# Patient Record
Sex: MALE | Race: WHITE | NOT HISPANIC OR LATINO | Employment: OTHER | URBAN - METROPOLITAN AREA
[De-identification: names, ages, dates, MRNs, and addresses within clinical notes are randomized per-mention and may not be internally consistent; named-entity substitution may affect disease eponyms.]

---

## 2024-04-08 ENCOUNTER — OFFICE VISIT (OUTPATIENT)
Dept: UROLOGY | Facility: CLINIC | Age: 84
End: 2024-04-08
Payer: MEDICARE

## 2024-04-08 VITALS
HEART RATE: 66 BPM | SYSTOLIC BLOOD PRESSURE: 165 MMHG | DIASTOLIC BLOOD PRESSURE: 67 MMHG | BODY MASS INDEX: 26.31 KG/M2 | HEIGHT: 72 IN | WEIGHT: 194.25 LBS

## 2024-04-08 DIAGNOSIS — C67.8 MALIGNANT NEOPLASM OF OVERLAPPING SITES OF BLADDER: Primary | ICD-10-CM

## 2024-04-08 DIAGNOSIS — R31.29 OTHER MICROSCOPIC HEMATURIA: ICD-10-CM

## 2024-04-08 LAB
BILIRUB UR QL STRIP: NEGATIVE
GLUCOSE UR QL STRIP: NEGATIVE
KETONES UR QL STRIP: NEGATIVE
LEUKOCYTE ESTERASE UR QL STRIP: NEGATIVE
PH, POC UA: 5
POC BLOOD, URINE: POSITIVE
POC NITRATES, URINE: NEGATIVE
POC RESIDUAL URINE VOLUME: 146 ML (ref 0–100)
PROT UR QL STRIP: NEGATIVE
SP GR UR STRIP: 1 (ref 1–1.03)
UROBILINOGEN UR STRIP-ACNC: 0.2 (ref 0.3–2.2)

## 2024-04-08 PROCEDURE — 99203 OFFICE O/P NEW LOW 30 MIN: CPT | Mod: PBBFAC,25 | Performed by: UROLOGY

## 2024-04-08 PROCEDURE — 99999PBSHW POCT BLADDER SCAN: Mod: PBBFAC,,,

## 2024-04-08 PROCEDURE — 51798 US URINE CAPACITY MEASURE: CPT | Mod: PBBFAC | Performed by: UROLOGY

## 2024-04-08 PROCEDURE — 81003 URINALYSIS AUTO W/O SCOPE: CPT | Mod: PBBFAC | Performed by: UROLOGY

## 2024-04-08 PROCEDURE — 99999PBSHW POCT URINALYSIS, DIPSTICK, AUTOMATED, W/O SCOPE: Mod: PBBFAC,,,

## 2024-04-08 PROCEDURE — 99999 PR PBB SHADOW E&M-NEW PATIENT-LVL III: CPT | Mod: PBBFAC,,, | Performed by: UROLOGY

## 2024-04-08 PROCEDURE — 99214 OFFICE O/P EST MOD 30 MIN: CPT | Mod: S$PBB,,, | Performed by: UROLOGY

## 2024-04-08 RX ORDER — SIMVASTATIN 20 MG/1
20 TABLET, FILM COATED ORAL
COMMUNITY
Start: 2024-01-29

## 2024-04-08 RX ORDER — METFORMIN HYDROCHLORIDE 1000 MG/1
1000 TABLET ORAL 2 TIMES DAILY
COMMUNITY

## 2024-04-08 RX ORDER — GLIPIZIDE 5 MG/1
5 TABLET ORAL 2 TIMES DAILY
COMMUNITY
Start: 2024-01-29

## 2024-04-08 RX ORDER — CLOPIDOGREL BISULFATE 75 MG/1
1 TABLET ORAL EVERY MORNING
COMMUNITY
Start: 2024-01-29

## 2024-04-08 RX ORDER — ASPIRIN 81 MG/1
1 TABLET ORAL EVERY MORNING
COMMUNITY

## 2024-04-08 NOTE — PROGRESS NOTES
Subjective:       Patient ID: Alexander Crawford is a 83 y.o. male.    Chief Complaint: Establish Care (/)      History of Present Illness:     Mr Crawford underwent a TURBT by me towards the end of 2023 for bladder cancer.  He underwent induction BCG treatments from December 2023 through the end of January 2024.  He tolerated the BCG treatments well.  Denies any significant LUTS.  No gross hematuria.           No past medical history on file.  Family History   Problem Relation Age of Onset    Lung cancer Father      Social History     Socioeconomic History    Marital status:    Tobacco Use    Smoking status: Former     Types: Cigarettes    Smokeless tobacco: Never    Tobacco comments:     Quit 30 yrs ago.    Substance and Sexual Activity    Alcohol use: Not Currently     Comment: Ocassionally drinks beer.    Drug use: Never     Outpatient Encounter Medications as of 4/8/2024   Medication Sig Dispense Refill    clopidogreL (PLAVIX) 75 mg tablet Take 1 tablet by mouth every morning.      glipiZIDE (GLUCOTROL) 5 MG tablet Take 5 mg by mouth 2 (two) times daily.      simvastatin (ZOCOR) 20 MG tablet Take 20 mg by mouth.      aspirin (ECOTRIN) 81 MG EC tablet Take 1 tablet by mouth every morning.      metFORMIN (GLUCOPHAGE) 1000 MG tablet Take 1,000 mg by mouth 2 (two) times daily.       No facility-administered encounter medications on file as of 4/8/2024.        Review of Systems   Constitutional:  Negative for chills and fever.   Respiratory:  Negative for shortness of breath.    Cardiovascular:  Negative for chest pain.   Gastrointestinal:  Negative for nausea and vomiting.   Genitourinary:  Negative for difficulty urinating, dysuria and hematuria.   Musculoskeletal:  Negative for back pain.   Skin:  Negative for rash.   Neurological:  Negative for dizziness.   Psychiatric/Behavioral:  Negative for agitation.        Objective:     BP (!) 165/67 (BP Location: Left arm, Patient Position: Sitting)   Pulse 66   Ht 6'  (1.829 m)   Wt 88.1 kg (194 lb 3.6 oz)   BMI 26.34 kg/m²     Physical Exam  Constitutional:       Appearance: Normal appearance.   Pulmonary:      Effort: Pulmonary effort is normal.   Abdominal:      Palpations: Abdomen is soft.   Neurological:      Mental Status: He is alert and oriented to person, place, and time.   Psychiatric:         Mood and Affect: Mood normal.         Office Visit on 04/08/2024   Component Date Value Ref Range Status    POC Residual Urine Volume 04/08/2024 146 (A)  0 - 100 mL Final    POC Blood, Urine 04/08/2024 Positive (A)  Negative Final    POC Bilirubin, Urine 04/08/2024 Negative  Negative Final    POC Urobilinogen, Urine 04/08/2024 0.2 (A)  0.3 - 2.2 Final    POC Ketones, Urine 04/08/2024 Negative  Negative Final    POC Protein, Urine 04/08/2024 Negative  Negative Final    POC Nitrates, Urine 04/08/2024 Negative  Negative Final    POC Glucose, Urine 04/08/2024 Negative  Negative Final    pH, UA 04/08/2024 5.0   Final    POC Specific Gravity, Urine 04/08/2024 1.005  1.003 - 1.029 Final    POC Leukocytes, Urine 04/08/2024 Negative  Negative Final        Results for orders placed or performed in visit on 04/08/24 (from the past 8760 hour(s))   POCT Bladder Scan   Result Value    POC Residual Urine Volume 146 (A)        Assessment:       1. Malignant neoplasm of overlapping sites of bladder    2. Other microscopic hematuria      Plan:     Orders Placed This Encounter    POCT Bladder Scan    POCT Urinalysis, Dipstick, Automated, W/O Scope        Assessment:  - TURBT by me towards the end of 2023 for bladder cancer.  He underwent induction BCG treatments from December 2023 through the end of January 2024.  - Persistent microscopic hematuria.  Small amount of microscopic hematuria today on 4-8-24.    Plan:  - RTC in 2 weeks for a surveillance cystoscopy.

## 2024-04-24 ENCOUNTER — PROCEDURE VISIT (OUTPATIENT)
Dept: UROLOGY | Facility: CLINIC | Age: 84
End: 2024-04-24
Payer: MEDICARE

## 2024-04-24 ENCOUNTER — HOSPITAL ENCOUNTER (OUTPATIENT)
Dept: RADIOLOGY | Facility: HOSPITAL | Age: 84
Discharge: HOME | End: 2024-04-24
Attending: UROLOGY
Payer: MEDICARE

## 2024-04-24 DIAGNOSIS — R31.29 OTHER MICROSCOPIC HEMATURIA: ICD-10-CM

## 2024-04-24 DIAGNOSIS — Z01.818 PREOP TESTING: ICD-10-CM

## 2024-04-24 DIAGNOSIS — C67.8 MALIGNANT NEOPLASM OF OVERLAPPING SITES OF BLADDER: Primary | ICD-10-CM

## 2024-04-24 LAB
BILIRUB UR QL STRIP: NEGATIVE
GLUCOSE UR QL STRIP: NEGATIVE
KETONES UR QL STRIP: NEGATIVE
LEUKOCYTE ESTERASE UR QL STRIP: NEGATIVE
PH, POC UA: 5.5
POC BLOOD, URINE: NEGATIVE
POC NITRATES, URINE: NEGATIVE
PROT UR QL STRIP: NEGATIVE
SP GR UR STRIP: 1.01 (ref 1–1.03)
UROBILINOGEN UR STRIP-ACNC: 0.2 (ref 0.3–2.2)

## 2024-04-24 PROCEDURE — 99999PBSHW POCT URINALYSIS, DIPSTICK, AUTOMATED, W/O SCOPE: Mod: PBBFAC,,,

## 2024-04-24 PROCEDURE — 88112 CYTOPATH CELL ENHANCE TECH: CPT | Performed by: PATHOLOGY

## 2024-04-24 PROCEDURE — 71046 X-RAY EXAM CHEST 2 VIEWS: CPT | Mod: 26,,, | Performed by: RADIOLOGY

## 2024-04-24 PROCEDURE — 52000 CYSTOURETHROSCOPY: CPT | Mod: S$PBB,,, | Performed by: UROLOGY

## 2024-04-24 PROCEDURE — 52000 CYSTOURETHROSCOPY: CPT | Mod: PBBFAC | Performed by: UROLOGY

## 2024-04-24 PROCEDURE — 99214 OFFICE O/P EST MOD 30 MIN: CPT | Mod: 57,S$PBB,, | Performed by: UROLOGY

## 2024-04-24 PROCEDURE — 71046 X-RAY EXAM CHEST 2 VIEWS: CPT | Mod: TC

## 2024-04-24 PROCEDURE — 81003 URINALYSIS AUTO W/O SCOPE: CPT | Mod: PBBFAC | Performed by: UROLOGY

## 2024-04-24 PROCEDURE — 88112 CYTOPATH CELL ENHANCE TECH: CPT | Mod: 26,,, | Performed by: PATHOLOGY

## 2024-04-24 RX ORDER — CIPROFLOXACIN 2 MG/ML
400 INJECTION, SOLUTION INTRAVENOUS
Status: CANCELLED | OUTPATIENT
Start: 2024-04-24

## 2024-04-24 NOTE — PROCEDURES
Cystoscopy    Date/Time: 4/24/2024 2:00 PM    Performed by: Raoul Rachel MD  Authorized by: Raoul Rachel MD    Consent Done?:  Yes (Verbal) and Yes (Written)  Timeout: prior to procedure the correct patient, procedure, and site was verified    Prep: patient was prepped and draped in usual sterile fashion    Local anesthesia used?: Yes    Anesthesia:  Lidocaine jelly  Indications: history bladder cancer    Position:  Supine  Anesthesia:  Lidocaine jelly  Preparation: Patient was prepped and draped in usual sterile fashion    Scope type:  Flexible cystoscope   patient tolerated the procedure well with no immediate complications  Comments:      The flexible cystoscope was advanced through his urethra into his bladder.  The bladder was inspected in a systematic fashion.  His bilateral ureteral orifices are orthotopic and patent with clear efflux of urine.  There is an approximately 2.5 cm papillary bladder tumor located at the right posterior wall of his bladder.  There is a few smaller papillary bladder tumors at the dome of his bladder.  There is a small bladder tumor located adjacent to his right ureteral orifice.  No stone and no foreign body is seen.  Moderate bladder trabeculations.  Moderately enlarged lateral lobes of his prostate and mildly enlarged median lobe of his prostate.   A bladder wash urine cytology was obtained.  The cystoscope was removed from his bladder.  The patient tolerated this procedure well.          11-22-23  Renal ultrasound.  No hydronephrosis or renal stone is seen bilaterally.  9 mm, 7 mm, and 6 mm adjacent simple appearing left renal cysts.  No right renal lesion is seen.    I reviewed his above imaging result.         10-13-23  PSA 0.99    I reviewed his above lab result.         Assessment:  - Bladder cancer.    - Surveillance cystoscopy today on 4-24-24 shows an approximately 2.5 cm papillary bladder tumor located at the right posterior wall of his bladder.  There is a few  smaller papillary bladder tumors at the dome of his bladder.  There is a small bladder tumor located adjacent to his right ureteral orifice.  Moderate bladder trabeculations. BPH.  - History of a TURBT and bladder biopsies with fulguration of 5 bladder tumors, normal bilateral RGPs, and right ureteral stent placement on 11-8-23.  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  Other biopsy locations showed high grade papillary urothelial carcinoma non invasive.    - History of bladder biopsies on 8-29-23 by Dr Moe Henning, Urologist in [place].  Path showed high grade urothelial carcinoma with no definitive invasion.  - He underwent induction BCG treatments (6 treatments) starting on 12-6-23.  - Persistent microscopic hematuria.  - He is a former smoker.  - CAD.  History of an MI.  He takes aspirin 81 mg and plavix.  He says that Dr Ty Suh is his Cardiologist.    Plan:  - I discussed options with the patient after his cystoscopy today and the mutual decision was made to proceed to the OR for a TURBT, cystoscopy with bladder biopsies with fulguration, bilateral retrograde pyelograms, right ureteral stent placement, possible left ureteral stent placement, and any other indicated procedures.  Risks and benefits of the surgery were explained to the patient and the patient expressed understanding.  All questions were answered by me to the patient's apparent understanding and to the patient's apparent satisfaction.  Surgery consent was signed today by the patient.   - EKG, chest x-ray, CMP, and CBC today.  - Bladder wash urine cytology today.  - Referral to his Cardiologist, Dr Ty Suh, for preop cardiac clearance and for clearance to stop blood thinners prior to his surgery.

## 2024-04-25 RX ORDER — ISOSORBIDE MONONITRATE 120 MG/1
120 TABLET, EXTENDED RELEASE ORAL DAILY
COMMUNITY

## 2024-04-25 RX ORDER — GABAPENTIN 300 MG/1
300 CAPSULE ORAL DAILY
COMMUNITY

## 2024-04-25 RX ORDER — LOSARTAN POTASSIUM AND HYDROCHLOROTHIAZIDE 12.5; 5 MG/1; MG/1
1 TABLET ORAL DAILY
COMMUNITY

## 2024-04-26 LAB
FINAL PATHOLOGIC DIAGNOSIS: ABNORMAL
Lab: ABNORMAL

## 2024-04-29 ENCOUNTER — TELEPHONE (OUTPATIENT)
Dept: PREADMISSION TESTING | Facility: HOSPITAL | Age: 84
End: 2024-04-29
Payer: MEDICARE

## 2024-04-29 NOTE — TELEPHONE ENCOUNTER
Pre op instructions reviewed with pt over telephone, verbalized understanding.    To confirm, Surgery is scheduled on 5/3/24. We will call you late afternoon the business day prior to surgery with your arrival time.    *Please report to the [facility] Lobby (1st Floor) located off of Critical access hospital (2nd Entrance/Building on the left, in front of the flag pole).  Address: [address] [place] [address] [place] [address]    Testing/Clearances needed before Surgery: cardiac clearance pending- Dr Quezada      INSTRUCTIONS IMPORTANT!!!  DO NOT Eat, Drink, or Smoke after 12 midnight unless instructed otherwise by your Surgeon. OK to brush teeth, no gum, candy or mints!    >>>MEDICATION INSTRUCTIONS<<<: Morning of Surgery, take small sip of water with ONLY these medications:  Isosorbide       *Blood Thinners: Call Cardiology office to confirm when to Stop taking Plavix prior to surgery per Physician Instructions! Call your Surgeon office to inquire about any questions regarding your blood thinner medication.    *Diabetic/ Prediabetic Patients: !!!If you take diabetic or weight loss medication, Do NOT take morning of surgery unless instructed by Doctor!!!  Metformin to be stopped 24 hrs prior to surgery.   Long Acting Insulin Instructions: HOLD the night before surgery unless instructed differently by Provider!  Ozempic/ Mounjaro/ Wegovy/ Trulicity/ Semaglutide injections or weight loss medication to be stopped 7 days prior to surgery.    !!!STOP ALL Aspirins, NSAIDS, WEIGHT LOSS INJECTIONS/PILLS, Herbal supplements, & Vitamins 7 DAYS BEFORE SURGERY!!!    ____  Avoid Alcoholic beverages 3 days prior to surgery, as it can thin the blood.  ____  NO Acrylic/fake nails or nail polish worn day of surgery (specifically hand/arm & foot surgeries).  ____  NO powder, lotions, deodorants, oils or cream on body.  ____  Remove all jewelry & piercings & foreign objects before arrival & leave at home.  ____  Remove Dentures, Hearing Aids  & Contact Lens prior to surgery.  ____  Bring photo ID and insurance information to hospital (Leave Valuables at Home).  ____  If going home the same day, arrange for a ride home. You will not be able to drive for 24 hrs if Anesthesia was used.   ____  Females (ages 11-60): may need to give a urine sample the morning of surgery; please see Pre op Nurse prior to using the restroom.  ____  Males: Stop ED medications (Viagra, Cialis) 24 hrs prior to surgery.  ____  Wear clean, loose fitting clothing to allow for dressings/ bandages.      Bathing Instructions:    -Shower with anti-bacterial Soap (Hibiclens or Dial) the night before surgery and the morning of.   -Do not use Hibiclens on your face or genitals.   -Apply clean clothes after shower.  -Do not shave your face or body 2 days prior to surgery unless instructed otherwise by your Surgeon.  -Do not shave pubic hair 7 days prior to surgery (gyn pt's).    [facility] Visitor/Ride Policy:  Only 2 adults allowed in pre op/recovery area during your procedure. You MUST HAVE A RIDE HOME from a responsible adult that you know and trust. Medical Transport, Uber or Lyft can ONLY be used if patient has a responsible adult to accompany them during ride home.       *Signs and symptoms of Infection Before or After Surgery:               !!!If you experience any fever, chills, nausea/ vomiting, foul odor/ excessive drainage from surgical site, flu-like symptoms, new wounds or cuts, PLEASE CALL THE SURGEON OFFICE at [phone] or SEND MESSAGE THROUGH [facility] PORTAL!!!     *If you are running late the morning of surgery, please call the Hospital Surgery Dept @ [phone].     *Billing questions:  [phone]     Thank you,  -[facility] Pre Admit Dept.  [phone] or [phone]  M-F 7:30 am-4:00 pm (Closed Major Holidays)

## 2024-04-30 ENCOUNTER — TELEPHONE (OUTPATIENT)
Dept: UROLOGY | Facility: CLINIC | Age: 84
End: 2024-04-30
Payer: MEDICARE

## 2024-04-30 NOTE — TELEPHONE ENCOUNTER
Called patient on both phone numbers and left a voicemail on both for patient. Also informed patient on the voicemail that we received cardiac clearance and the instructions to stop his Plavix and Aspirin.    Patsy Brannon LPN      ----- Message from Raoul Rachel MD sent at 4/26/2024  9:31 AM CDT -----  I called Mr Crawford and he did not answer the phone and I left him a voice message.  Please call him to verify that he got my voice message that I reviewed his lab results and his labs overall look good.  His hemoglobin was mildly low at 11.9.  His chest x-ray was normal.  Please fax his lab results, chest x-ray result, and EKG result to his PCP and to his Cardiologist and please make sure we get cardiac clearance.  Make sure he knows the details of his surgery.  Ask him if he has any questions.

## 2024-05-02 ENCOUNTER — TELEPHONE (OUTPATIENT)
Dept: PREADMISSION TESTING | Facility: HOSPITAL | Age: 84
End: 2024-05-02
Payer: MEDICARE

## 2024-05-02 NOTE — TELEPHONE ENCOUNTER
Called and spoke with pt about the following:     Please arrive to [facility] at 0730am on 5/3/24 for your scheduled procedure.  Address: [address] [place] [address] [place] [address] (2nd Building on left, 1st Floor Lobby)  >>>NO eating or drinking after midnight unless instructed otherwise by your Surgeon<<<    Thank you,  -[facility] Pre [facility] Testing Dept.  Mon-Fri 7:30 am - 4 pm [phone]

## 2024-05-03 ENCOUNTER — HOSPITAL ENCOUNTER (OUTPATIENT)
Facility: HOSPITAL | Age: 84
Discharge: HOME | End: 2024-05-03
Attending: UROLOGY | Admitting: UROLOGY
Payer: MEDICARE

## 2024-05-03 ENCOUNTER — ANESTHESIA (OUTPATIENT)
Dept: SURGERY | Facility: HOSPITAL | Age: 84
End: 2024-05-03
Payer: MEDICARE

## 2024-05-03 ENCOUNTER — ANESTHESIA EVENT (OUTPATIENT)
Dept: SURGERY | Facility: HOSPITAL | Age: 84
End: 2024-05-03
Payer: MEDICARE

## 2024-05-03 DIAGNOSIS — C67.8 MALIGNANT NEOPLASM OF OVERLAPPING SITES OF BLADDER: ICD-10-CM

## 2024-05-03 LAB — POCT GLUCOSE: 127 MG/DL (ref 70–110)

## 2024-05-03 PROCEDURE — C1769 GUIDE WIRE: HCPCS | Performed by: UROLOGY

## 2024-05-03 PROCEDURE — C1758 CATHETER, URETERAL: HCPCS | Performed by: UROLOGY

## 2024-05-03 PROCEDURE — 37000009 HC ANESTHESIA EA ADD 15 MINS: Performed by: UROLOGY

## 2024-05-03 PROCEDURE — 25000003 PHARM REV CODE 250: Performed by: NURSE ANESTHETIST, CERTIFIED REGISTERED

## 2024-05-03 PROCEDURE — 63600175 PHARM REV CODE 636 W HCPCS: Performed by: ANESTHESIOLOGY

## 2024-05-03 PROCEDURE — 37000008 HC ANESTHESIA 1ST 15 MINUTES: Performed by: UROLOGY

## 2024-05-03 PROCEDURE — 71000033 HC RECOVERY, INTIAL HOUR: Performed by: UROLOGY

## 2024-05-03 PROCEDURE — 25500020 PHARM REV CODE 255: Performed by: UROLOGY

## 2024-05-03 PROCEDURE — 88307 TISSUE EXAM BY PATHOLOGIST: CPT | Mod: 59 | Performed by: PATHOLOGY

## 2024-05-03 PROCEDURE — 36000706: Performed by: UROLOGY

## 2024-05-03 PROCEDURE — 52240 CYSTOSCOPY AND TREATMENT: CPT | Mod: ,,, | Performed by: UROLOGY

## 2024-05-03 PROCEDURE — 25000003 PHARM REV CODE 250: Performed by: UROLOGY

## 2024-05-03 PROCEDURE — 63600175 PHARM REV CODE 636 W HCPCS: Performed by: NURSE ANESTHETIST, CERTIFIED REGISTERED

## 2024-05-03 PROCEDURE — 36000707: Performed by: UROLOGY

## 2024-05-03 PROCEDURE — 71000015 HC POSTOP RECOV 1ST HR: Performed by: UROLOGY

## 2024-05-03 PROCEDURE — 88307 TISSUE EXAM BY PATHOLOGIST: CPT | Mod: 26,,, | Performed by: PATHOLOGY

## 2024-05-03 PROCEDURE — 74420 UROGRAPHY RTRGR +-KUB: CPT | Mod: 26,,, | Performed by: UROLOGY

## 2024-05-03 PROCEDURE — 82962 GLUCOSE BLOOD TEST: CPT | Performed by: UROLOGY

## 2024-05-03 PROCEDURE — 25000003 PHARM REV CODE 250: Performed by: ANESTHESIOLOGY

## 2024-05-03 PROCEDURE — C2617 STENT, NON-COR, TEM W/O DEL: HCPCS | Performed by: UROLOGY

## 2024-05-03 PROCEDURE — 63600175 PHARM REV CODE 636 W HCPCS: Performed by: UROLOGY

## 2024-05-03 PROCEDURE — 27201423 OPTIME MED/SURG SUP & DEVICES STERILE SUPPLY: Performed by: UROLOGY

## 2024-05-03 DEVICE — STENT URETERAL UNIV 6FR 26CM: Type: IMPLANTABLE DEVICE | Site: URETER | Status: FUNCTIONAL

## 2024-05-03 RX ORDER — HYOSCYAMINE SULFATE 0.12 MG/1
0.12 TABLET SUBLINGUAL EVERY 6 HOURS PRN
Qty: 30 TABLET | Refills: 1 | Status: SHIPPED | OUTPATIENT
Start: 2024-05-03

## 2024-05-03 RX ORDER — KETOROLAC TROMETHAMINE 30 MG/ML
15 INJECTION, SOLUTION INTRAMUSCULAR; INTRAVENOUS EVERY 8 HOURS PRN
Status: DISCONTINUED | OUTPATIENT
Start: 2024-05-03 | End: 2024-05-03 | Stop reason: HOSPADM

## 2024-05-03 RX ORDER — HYDROMORPHONE HYDROCHLORIDE 2 MG/ML
0.2 INJECTION, SOLUTION INTRAMUSCULAR; INTRAVENOUS; SUBCUTANEOUS EVERY 5 MIN PRN
Status: DISCONTINUED | OUTPATIENT
Start: 2024-05-03 | End: 2024-05-03 | Stop reason: HOSPADM

## 2024-05-03 RX ORDER — ONDANSETRON HYDROCHLORIDE 2 MG/ML
INJECTION, SOLUTION INTRAVENOUS
Status: DISCONTINUED | OUTPATIENT
Start: 2024-05-03 | End: 2024-05-03

## 2024-05-03 RX ORDER — LIDOCAINE HYDROCHLORIDE 20 MG/ML
JELLY TOPICAL
Status: DISCONTINUED | OUTPATIENT
Start: 2024-05-03 | End: 2024-05-03 | Stop reason: HOSPADM

## 2024-05-03 RX ORDER — ONDANSETRON HYDROCHLORIDE 2 MG/ML
4 INJECTION, SOLUTION INTRAVENOUS DAILY PRN
Status: DISCONTINUED | OUTPATIENT
Start: 2024-05-03 | End: 2024-05-03 | Stop reason: HOSPADM

## 2024-05-03 RX ORDER — ROCURONIUM BROMIDE 10 MG/ML
INJECTION, SOLUTION INTRAVENOUS
Status: DISCONTINUED | OUTPATIENT
Start: 2024-05-03 | End: 2024-05-03

## 2024-05-03 RX ORDER — PROPOFOL 10 MG/ML
VIAL (ML) INTRAVENOUS
Status: DISCONTINUED | OUTPATIENT
Start: 2024-05-03 | End: 2024-05-03

## 2024-05-03 RX ORDER — CIPROFLOXACIN 2 MG/ML
400 INJECTION, SOLUTION INTRAVENOUS
Status: COMPLETED | OUTPATIENT
Start: 2024-05-03 | End: 2024-05-03

## 2024-05-03 RX ORDER — OXYCODONE AND ACETAMINOPHEN 5; 325 MG/1; MG/1
1 TABLET ORAL
Status: DISCONTINUED | OUTPATIENT
Start: 2024-05-03 | End: 2024-05-03 | Stop reason: HOSPADM

## 2024-05-03 RX ADMIN — HYDROMORPHONE HYDROCHLORIDE 0.2 MG: 2 INJECTION, SOLUTION INTRAMUSCULAR; INTRAVENOUS; SUBCUTANEOUS at 10:05

## 2024-05-03 RX ADMIN — CIPROFLOXACIN 400 MG: 2 INJECTION, SOLUTION INTRAVENOUS at 08:05

## 2024-05-03 RX ADMIN — OXYCODONE HYDROCHLORIDE AND ACETAMINOPHEN 1 TABLET: 5; 325 TABLET ORAL at 10:05

## 2024-05-03 RX ADMIN — ONDANSETRON 4 MG: 2 INJECTION INTRAMUSCULAR; INTRAVENOUS at 08:05

## 2024-05-03 RX ADMIN — ROCURONIUM BROMIDE 50 MG: 10 INJECTION, SOLUTION INTRAVENOUS at 08:05

## 2024-05-03 RX ADMIN — SODIUM CHLORIDE, SODIUM LACTATE, POTASSIUM CHLORIDE, AND CALCIUM CHLORIDE: .6; .31; .03; .02 INJECTION, SOLUTION INTRAVENOUS at 09:05

## 2024-05-03 RX ADMIN — KETOROLAC TROMETHAMINE 15 MG: 30 INJECTION, SOLUTION INTRAMUSCULAR at 10:05

## 2024-05-03 RX ADMIN — PROPOFOL 100 MG: 10 INJECTION, EMULSION INTRAVENOUS at 08:05

## 2024-05-03 RX ADMIN — SODIUM CHLORIDE, SODIUM LACTATE, POTASSIUM CHLORIDE, AND CALCIUM CHLORIDE: .6; .31; .03; .02 INJECTION, SOLUTION INTRAVENOUS at 08:05

## 2024-05-03 RX ADMIN — SUGAMMADEX 200 MG: 100 INJECTION, SOLUTION INTRAVENOUS at 08:05

## 2024-05-03 NOTE — ANESTHESIA PROCEDURE NOTES
Intubation    Date/Time: 5/3/2024 8:37 AM    Performed by: Iron Boudreaux CRNA  Authorized by: Tawanda Wood II, MD    Intubation:     Induction:  Intravenous    Intubated:  Postinduction    Mask Ventilation:  Easy mask    Attempts:  1    Attempted By:  CRNA    Method of Intubation:  Direct    Blade:  Osman 3    Laryngeal View Grade: Grade I - full view of cords      Difficult Airway Encountered?: No      Complications:  None    Airway Device:  Oral endotracheal tube    Airway Device Size:  7.5    Style/Cuff Inflation:  Cuffed (inflated to minimal occlusive pressure)    Inflation Amount (mL):  8    Tube secured:  22    Secured at:  The lips    Placement Verified By:  Capnometry    Complicating Factors:  None    Findings Post-Intubation:  BS equal bilateral

## 2024-05-03 NOTE — ANESTHESIA POSTPROCEDURE EVALUATION
Anesthesia Post Evaluation    Patient: Alexander Crawford    Procedure(s) Performed: Procedure(s) (LRB):  TURBT (TRANSURETHRAL RESECTION OF BLADDER TUMOR) (Bilateral)  CYSTOSCOPY, WITH RETROGRADE PYELOGRAM AND URETERAL STENT INSERTION (Bilateral)    Final Anesthesia Type: general      Patient location during evaluation: PACU  Patient participation: Yes- Able to Participate  Level of consciousness: awake and alert  Post-procedure vital signs: reviewed and stable  Pain management: adequate  Airway patency: patent  ANITA mitigation strategies: Verification of full reversal of neuromuscular block  PONV status at discharge: No PONV  Anesthetic complications: no      Cardiovascular status: hemodynamically stable  Respiratory status: spontaneous ventilation  Hydration status: euvolemic  Follow-up not needed.              Vitals Value Taken Time   /88 05/03/24 1040   Temp 36.6 °C (97.8 °F) 05/03/24 1040   Pulse 52 05/03/24 1042   Resp 19 05/03/24 1042   SpO2 97 % 05/03/24 1042   Vitals shown include unfiled device data.      Event Time   Out of Recovery 10:45:47         Pain/Toshia Score: Pain Rating Prior to Med Admin: 5 (5/3/2024 10:32 AM)  Toshia Score: 10 (5/3/2024 11:10 AM)

## 2024-05-03 NOTE — TRANSFER OF CARE
Anesthesia Transfer of Care Note    Patient: Alexander Crawford    Procedure(s) Performed: Procedure(s) (LRB):  TURBT (TRANSURETHRAL RESECTION OF BLADDER TUMOR) (Bilateral)  CYSTOSCOPY, WITH RETROGRADE PYELOGRAM AND URETERAL STENT INSERTION (Bilateral)    Patient location: PACU    Anesthesia Type: general    Transport from OR: Transported from OR on room air with adequate spontaneous ventilation    Post pain: adequate analgesia    Post assessment: no apparent anesthetic complications    Post vital signs: stable    Level of consciousness: awake    Nausea/Vomiting: no nausea/vomiting    Complications: none    Transfer of care protocol was followed      Last vitals: Visit Vitals  BP (!) 190/82   Pulse (!) 58   Temp 36.8 °C (98.2 °F) (Temporal)   Resp 16   Ht 6' (1.829 m)   Wt 85.3 kg (188 lb 2.6 oz)   SpO2 98%   BMI 25.52 kg/m²

## 2024-05-03 NOTE — OP NOTE
Surgeon:  Dr Raoul Rachel.     Date:  5-3-2024.     Pre operative diagnosis:  Bladder cancer in multiple sites in his bladder.     Post operative diagnosis:  Bladder cancer in multiple sites in his bladder.     Surgery:  1) Transurethral resection of a large bladder tumor measuring up to 5.5 cm and several other bladder tumors..   2) Bilateral retrograde pyelograms with right ureteral stent placement.     Anesthesia:  General.     Estimated blood loss:  1 ml.     Complications:  None.     Specimens:  Bladder tumors for permanent specimen.     Procedure in details:  Risks and benefits of the surgery were explained to the patient prior to the surgery and the patient expressed understanding.  All questions were answered by me to the patient's apparent understanding and to the patient's apparent satisfaction.  Surgery consent was signed by the patient prior to the surgery.  The patient was taken to the OR and placed in the supine position initially.  Anesthesia was administered by the Anesthesia team.  The patient was then placed in the dorsal lithotomy position.  He was prepped and drapped in the usual sterile fashion.  An IV antibiotic was given to the patient prior to the surgery.  A timeout was done prior to the surgery.  A 21 F rigid cystoscope was advanced through the urethra into his bladder.  The bladder was inspected in a systematic fashion.  There is a large papillary left posterior bladder wall tumor measuring approximately 5.5 cm in size.  There is another papillary bladder tumor located at the midline of the posterior wall of his bladder measuring approximately 2 cm.  There are 2 bladder dome tumors measuring approximately 1.5 cm and 2 cm.  There is another flat bladder tumor about 2 cm in size located just medial and inferior but adjacent to his right ureteral orifice.   His bilateral ureteral orifices are orthotopic and patent with clear efflux.  I cannulated his left ureteral orifice with a 5 F open ended  ureteral catheter and a gentle left retrograde pyelogram was shot which was normal with no hydronephrosis, no filling defect, no extravasation, and good drainage of his left kidney.   I then cannulated his right ureteral orifice with a 5 F open ended ureteral catheter and a gentle right retrograde pyelogram was shot which was normal with no hydronephrosis, no filling defect, no extravasation, and good drainage of his right kidney.  Due to the location of his right sided bladder tumor located adjacent to his right ureteral orifice, I placed a right ureteral stent.  A motion wire was advanced through the ureteral catheter into his right renal collecting system which was seen under fluoroscopy.  The 5 F open ended ureteral catheter was then back loaded off of the wire.  The string was then cut off of the right ureteral stent.  I then advanced a 6 F by 26 cm right ureteral stent over the sensor wire and used the pusher to push the distal curl into his bladder.  The proximal curl of his right ureteral stent was seen in good position within his right renal collecting system fluoroscopically.  The distal curl of his right ureteral stent was seen to be in good position cystoscopically.  The cystoscope was then removed from his body.  The resectoscope with then advanced through his urethra into his bladder.  I used the resectoscope loop to resect all of his bladder tumors to visual completion.  I asked the nurse to label all of the removed bladder tumor specimens as they were removed which was the large left posterior bladder wall tumor, the midline posterior bladder wall tumor, the 2 bladder dome tumors, and the bladder tumor just adjacent to his right ureteral orifice.  I asked the nurse to send all of his bladder tumor specimens to pathology for permanent specimen.  I used the loop to cauterize all areas of the bladder that were resected. I used the ellick evacuator to irrigate his bladder several times with sterile water  at the end of the procedure..   I then turn down the flow and I did not see any bleeding from bladder resection sites or anywhere else.  I did not see any further tumors or abnormal urothelial lesions at the resection sites or anywhere else.  The resectoscope was then removed from his body.  I then placed a 22 F 2 way stacy catheter through the urethra into his bladder.  When yellow colored urine drained from his stacy catheter, 10 cc of sterile water was inflated into his stacy catheter balloon.  I manually irrigated his stacy catheter with 60 cc of sterile water and his stacy catheter irrigated easily, there were no blood clots, and his urine was clear.  A large drainage bag was attached to his stacy catheter.  A post operative timeout was done at the end of the procedure.  The patient was taken to the recovery room in stable condition at the end of the procedure.

## 2024-05-03 NOTE — ANESTHESIA PREPROCEDURE EVALUATION
05/03/2024  Alexander Crawford is a 83 y.o., male.    There is no problem list on file for this patient.    Past Surgical History:   Procedure Laterality Date    BACK SURGERY      3 back surgeries    CAROTID ENDARTERECTOMY Bilateral     CORONARY ANGIOPLASTY      CYSTOSCOPY      TURBT (TRANSURETHRAL RESECTION OF BLADDER TUMOR)      x2       Pre-op Assessment    I have reviewed the Patient Summary Reports.    I have reviewed the NPO Status.   I have reviewed the Medications.     Review of Systems  Anesthesia Hx:  No problems with previous Anesthesia                Social:  Non-Smoker, Former Smoker       Hematology/Oncology:  Hematology Normal                                     Cardiovascular:     Hypertension  Past MI CAD           hyperlipidemia   ECG has been reviewed. Pt has had balloon angioplasty approx 3 years ago.  Occasional chest pain treated with isosorbide.  This has been stable.  Saw his cardiologist recently.                         Pulmonary:  Pulmonary Normal                       Renal/:  Renal/ Normal                 Hepatic/GI:  Hepatic/GI Normal                 Neurological:   CVA                                    Endocrine:  Diabetes               Physical Exam  General: Well nourished    Airway:  Mallampati: III   Mouth Opening: Normal  TM Distance: Normal  Neck ROM: Normal ROM    Dental:  Intact        Anesthesia Plan  Type of Anesthesia, risks & benefits discussed:    Anesthesia Type: Gen ETT, Gen Supraglottic Airway  Intra-op Monitoring Plan: Standard ASA Monitors  Post Op Pain Control Plan: multimodal analgesia  Induction:  IV  Airway Plan: , Post-Induction  Informed Consent: Informed consent signed with the Patient and all parties understand the risks and agree with anesthesia plan.  All questions answered.   ASA Score: 3    Ready For Surgery From Anesthesia Perspective.     .       Chemistry        Component Value Date/Time     04/24/2024 1525    K 4.1 04/24/2024 1525     04/24/2024 1525    CO2 25 04/24/2024 1525    BUN 19 04/24/2024 1525    CREATININE 1.1 04/24/2024 1525    GLU 93 04/24/2024 1525        Component Value Date/Time    CALCIUM 9.5 04/24/2024 1525    ALKPHOS 71 04/24/2024 1525    AST 12 04/24/2024 1525    ALT 7 (L) 04/24/2024 1525    BILITOT 0.6 04/24/2024 1525        Lab Results   Component Value Date    WBC 4.69 04/24/2024    HGB 11.9 (L) 04/24/2024    HCT 36.3 (L) 04/24/2024     (H) 04/24/2024     04/24/2024

## 2024-05-07 ENCOUNTER — TELEPHONE (OUTPATIENT)
Dept: UROLOGY | Facility: CLINIC | Age: 84
End: 2024-05-07
Payer: MEDICARE

## 2024-05-07 VITALS
HEART RATE: 59 BPM | OXYGEN SATURATION: 99 % | RESPIRATION RATE: 18 BRPM | TEMPERATURE: 98 F | HEIGHT: 72 IN | WEIGHT: 188.19 LBS | SYSTOLIC BLOOD PRESSURE: 174 MMHG | BODY MASS INDEX: 25.49 KG/M2 | DIASTOLIC BLOOD PRESSURE: 81 MMHG

## 2024-05-07 LAB
FINAL PATHOLOGIC DIAGNOSIS: NORMAL
GROSS: NORMAL
Lab: NORMAL

## 2024-05-07 NOTE — TELEPHONE ENCOUNTER
CHELSEY for patient letting him know of post-op appointment with Dr. Rachel on 05/10/2024.    Patsy Brannon LPN  ----- Message from Lilia Cox sent at 5/6/2024 11:34 AM CDT -----  Contact: self  [phone]  Patient called in this morning wanting to know when his catheter be removed. [facility]  [phone]. Thanks homa

## 2024-05-07 NOTE — TELEPHONE ENCOUNTER
Patient was scheduled, I called home phone as well as patient cell phone number and left a detailed message in regards to his appointment.     ----- Message from Raoul Rachel MD sent at 5/3/2024 10:49 AM CDT -----  Please call and schedule Mr Crawford a post operative appointment to see me on Friday 5- at [facility]

## 2024-05-10 ENCOUNTER — OFFICE VISIT (OUTPATIENT)
Dept: UROLOGY | Facility: CLINIC | Age: 84
End: 2024-05-10
Payer: MEDICARE

## 2024-05-10 VITALS
WEIGHT: 190.38 LBS | RESPIRATION RATE: 16 BRPM | SYSTOLIC BLOOD PRESSURE: 144 MMHG | HEART RATE: 76 BPM | BODY MASS INDEX: 25.78 KG/M2 | HEIGHT: 72 IN | DIASTOLIC BLOOD PRESSURE: 74 MMHG

## 2024-05-10 DIAGNOSIS — C67.8 MALIGNANT NEOPLASM OF OVERLAPPING SITES OF BLADDER: Primary | ICD-10-CM

## 2024-05-10 DIAGNOSIS — R31.29 OTHER MICROSCOPIC HEMATURIA: ICD-10-CM

## 2024-05-10 PROCEDURE — 99213 OFFICE O/P EST LOW 20 MIN: CPT | Mod: PBBFAC | Performed by: UROLOGY

## 2024-05-10 PROCEDURE — 99999 PR PBB SHADOW E&M-EST. PATIENT-LVL III: CPT | Mod: PBBFAC,,, | Performed by: UROLOGY

## 2024-05-10 PROCEDURE — 99215 OFFICE O/P EST HI 40 MIN: CPT | Mod: S$PBB,,, | Performed by: UROLOGY

## 2024-05-10 NOTE — PROGRESS NOTES
Subjective:       Patient ID: Alexander Crawford is a 83 y.o. male.    Chief Complaint: Post-op Evaluation      History of Present Illness:     Mr Crawford has bladder cancer in multiple sites in his bladder and is s/p transurethral resection of several bladder tumors, bilateral retrograde pyelograms, and right ureteral stent placement by me on 5-3-24.  Pathology of his midline posterior wall, left posterior wall, two dome tumors, and trigone near the right ureteral orifice all showed high grade papillary urothelial carcinoma, no definitive evidence of invasion, and muscularis propria is present and uninvolved by tumor.  He says his stacy catheter is working well.  He says has not had any blood in his urine since the surgery.      He has a history of a TURBT and bladder biopsies with fulguration of 5 bladder tumors, normal bilateral RGPs, and right ureteral stent placement on 11-8-23.  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  Other biopsy locations showed high grade papillary urothelial carcinoma non invasive.  He underwent induction BCG treatments (6 treatments) starting on 12-6-23.    He has a history of bladder biopsies on 8-29-23 by Dr Moe Henning, Urologist in [place].  Path showed high grade urothelial carcinoma with no definitive invasion.           Past Medical History:   Diagnosis Date    Bladder cancer     CAD (coronary artery disease)     CVA (cerebral vascular accident)     Diabetes mellitus     HLD (hyperlipidemia)     Hypertension     Myocardial infarction      Family History   Problem Relation Name Age of Onset    Lung cancer Father       Social History     Socioeconomic History    Marital status:    Tobacco Use    Smoking status: Former     Types: Cigarettes    Smokeless tobacco: Never    Tobacco comments:     Quit 30 yrs ago.    Substance and Sexual Activity    Alcohol use:  Not Currently     Comment: Ocassionally drinks beer.    Drug use: Never     Social Determinants of Health     Financial Resource Strain: Low Risk  (10/31/2023)    [facility]    Overall Financial Resource Strain (CARDIA)     Difficulty of Paying Living Expenses: Not very hard   Food Insecurity: No Food Insecurity (10/31/2023)    [facility]    Hunger Vital Sign     Worried About Running Out of Food in the Last Year: Never true     Ran Out of Food in the Last Year: Never true   Transportation Needs: No Transportation Needs (10/31/2023)    [facility]    PRAPARE - Transportation     Lack of Transportation (Medical): No     Lack of Transportation (Non-Medical): No   Physical Activity: Insufficiently Active (10/31/2023)    [facility], [facility]    Exercise Vital Sign     Days of Exercise per Week: 3 days     Minutes of Exercise per Session: 30 min   Housing Stability: Unknown (10/31/2023)    [facility], [facility]    Housing Stability Vital Sign     Number of Places Lived in the Last Year: 1     Outpatient Encounter Medications as of 5/10/2024   Medication Sig Dispense Refill     aspirin (ECOTRIN) 81 MG EC tablet Take 1 tablet by mouth every morning.      clopidogreL (PLAVIX) 75 mg tablet Take 1 tablet by mouth every morning.      gabapentin (NEURONTIN) 300 MG capsule Take 300 mg by mouth Daily.      glipiZIDE (GLUCOTROL) 5 MG tablet Take 5 mg by mouth 2 (two) times daily.      hyoscyamine (LEVSIN) 0.125 mg Subl Place 1 tablet (0.125 mg total) under the tongue every 6 (six) hours as needed (Take 1 under the tongue every 6 hours as needed for bladder spasms). 30 tablet 1    isosorbide mononitrate (IMDUR) 120 MG 24 hr tablet Take 120 mg by mouth once daily.      losartan-hydrochlorothiazide 50-12.5 mg (HYZAAR) 50-12.5 mg per tablet Take 1 tablet by mouth once daily.      metFORMIN (GLUCOPHAGE) 1000 MG tablet Take 1,000 mg by mouth 2 (two) times daily.      simvastatin (ZOCOR) 20 MG tablet Take 20 mg by mouth.       Facility-Administered Encounter Medications as of 5/10/2024   Medication Dose Route Frequency Provider Last Rate Last Admin    [START ON 6/7/2024] BCG live (PILO) 50 mg in sodium chloride 0.9% 50 mL bladder instillation  50 mg Intravesical See admin instructions             Review of Systems   Constitutional:  Negative for chills and fever.   Respiratory:  Negative for shortness of breath.    Cardiovascular:  Negative for chest pain.   Gastrointestinal:  Negative for nausea and vomiting.   Genitourinary:  Negative for flank pain and hematuria.   Musculoskeletal:  Negative for back pain.   Skin:  Negative for rash.   Neurological:  Negative for dizziness.   Psychiatric/Behavioral:  Negative for agitation.        Objective:     BP (!) 144/74 (BP Location: Right arm, Patient Position: Sitting)   Pulse 76   Resp 16   Ht 6' (1.829 m)   Wt 86.4 kg (190 lb 5.9 oz)   BMI 25.82 kg/m²     Physical Exam  Genitourinary:     Comments: Urethral stacy catheter is in place draining yellow colored urine.        No visits with results within 1 Day(s) from this visit.   Latest known visit with  results is:   Admission on 05/03/2024, Discharged on 05/03/2024   Component Date Value Ref Range Status    POCT Glucose 05/03/2024 127 (H)  70 - 110 mg/dL Final    Final Pathologic Diagnosis 05/03/2024    Final                    Value:1. Bladder, midline posterior wall, transurethral resection:  - High-grade papillary urothelial carcinoma  - No definitive evidence of invasion  - Muscularis propria is present and uninvolved by tumor  - Multiple additional levels have been examined    2. Bladder, left posterior wall, transurethral resection:  - High-grade papillary urothelial carcinoma  - No definitive evidence of invasion  - Muscularis propria is present and uninvolved by tumor  - Multiple additional levels have been examined    3. Bladder, dome, transurethral resection:  - High-grade papillary urothelial carcinoma  - No definitive evidence of invasion  - Muscularis propria is present and uninvolved by tumor  - Multiple additional levels have been examined    4. Bladder, trigone near right ureteral orifice, transurethral resection:  - High-grade papillary urothelial carcinoma  - No definitive evidence of invasion  - Muscularis propria is present and uninvolved by tumor    5. Bladder, right dome, transurethral resecti                          on:  - High-grade papillary urothelial carcinoma  - No definitive evidence of invasion  - Muscularis propria is present and uninvolved by tumor      Interp By PORTILLO Meehan MD, Signed on 05/07/2024 at 11:45    Gross 05/03/2024    Final                    Value:1: Surgery ID:  [id number]   Pathology ID:  [id number]  1. Received in formalin labeled &quot;mid lined posterior bladder wall tumor&quot; are 2 tan-pink tissue fragments aggregating to less than 1 g and 1 x 1 x 0.5 cm.  The specimen is submitted entirely in cassette 1A.  VCU--1-A        Grossed by: Angle Jay MS, PA([facility])   2: Surgery ID:  [id number]   Pathology ID:  [id number]  2. Received in formalin labeled  &quot;left posterior bladder wall tumor&quot; is a less than 1 g, 1.5 x 1.5 x 0.5 cm aggregate of tan-white tan-pink tissue fragments.  The specimen is submitted entirely in cassette 2A.    AYF--2-A        Grossed by: Angel Jay MS, PA([facility])   3: Surgery ID:  [id number]   Pathology ID:  [id number]  3. Received in formalin labeled &quot;dome of bladder&quot; is a single tan-pink tissue fragment measuring 0.8 x 0.8 x 0.4 cm and less than 1 g.  The specimen is submitted entirely in cassette 3A.    LRU--3-A        Grossed by: Angel Jay MS, PA([facility])   4: Surg                          rama ID:  [id number]   Pathology ID:  [id number]  Right trigone near right ureteral orifice Received in formalin labeled &quot;right trigone near right ureteral orifice&quot; is a less than 1 g, 1 x 1 x 0.3 cm aggregate of tan-pink tissue fragments.  The specimen is submitted entirely in cassette 4A.    SJI--4-A        Grossed by: Angel Jay MS, PA([facility])   5: Surgery ID:  [id number]   Pathology ID:  [id number]  5. Received in formalin labeled &quot;right bladder dome&quot; is a single tan-pink tissue fragment measuring less than 1 g and 0.5 x 0.5 x 0.4 cm.  The specimen is submitted entirely in cassette 5A.    RIA--5-A        Grossed by: Angel Jay MS, PA([facility])      Disclaimer 05/03/2024 Unless the case is a 'gross only' or additional testing only, the final diagnosis for each specimen is based on a microscopic examination of appropriate tissue sections.   Final        Results for orders placed or performed in visit on 04/08/24 (from the past 8760 hour(s))   POCT Bladder Scan   Result Value    POC Residual Urine Volume 146 (A)        Assessment:       1. Malignant neoplasm of overlapping sites of bladder    2. Other microscopic hematuria      Plan:     Orders Placed This Encounter    Prior authorization Order    BCG live (PILO) 50 mg in sodium chloride 0.9% 50 mL bladder instillation            11-22-23   Renal ultrasound.  No hydronephrosis or renal stone is seen bilaterally.  9 mm, 7 mm, and 6 mm adjacent simple appearing left renal cysts.  No right renal lesion is seen.     I reviewed his above imaging result.            10-13-23  PSA 0.99    4-24-24  Cr 1.1.  GFR >60.     I reviewed his above lab result.         Assessment:  - Bladder cancer in multiple sites in his bladder s/p transurethral resection of several bladder tumors, bilateral retrograde pyelograms, and right ureteral stent placement on 5-3-24.  Path of his midline posterior wall, left posterior wall, two dome tumors, and trigone near the right ureteral orifice all showed high grade papillary urothelial carcinoma, no definitive evidence of invasion, and muscularis propria is present and uninvolved by tumor.  - He underwent induction BCG treatments (6 treatments) starting on 12-6-23.  - History of a TURBT and bladder biopsies with fulguration of 5 bladder tumors, normal bilateral RGPs, and right ureteral stent placement on 11-8-23.  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  Other biopsy locations showed high grade papillary urothelial carcinoma non invasive.    - History of bladder biopsies on 8-29-23 by Dr Moe Henning, Urologist in [place].  Path showed high grade urothelial carcinoma with no definitive invasion.  - Persistent microscopic hematuria.  - BPH without significant LUTS.  - He is a former smoker.  - CAD.  History of an MI.  He takes aspirin 81 mg and plavix.  He says that Dr Ty Suh is his Cardiologist.    Plan:  - I discussed his pathology report with him today.  I discussed options with him today and the mutual decision was to proceed with a 2nd round of induction BCG treatments (1 treatment per week for 6 weeks).  - I removed his stacy catheter today.  - I discussed dietary modifications with him today and I  recommended he drink mostly water during the day.   - RTC in 4 weeks to start his first of 6 BCG treatments.  - RTC to see me for an office visit 3 weeks after last BCG treatment.  Will plan a restaging TURBT in the OR approximately 6 weeks after his last BCG treatment.  - RTC in 2 weeks for a cystoscopy with right ureteral stent removal.

## 2024-05-15 ENCOUNTER — TELEPHONE (OUTPATIENT)
Dept: UROLOGY | Facility: CLINIC | Age: 84
End: 2024-05-15
Payer: MEDICARE

## 2024-05-15 NOTE — TELEPHONE ENCOUNTER
Called patient and there was a beep but not sure if I was able to leave a message or not.    Patsy Brannon LPN    ----- Message from Jesus Alberto Rosen sent at 5/15/2024 11:03 AM CDT -----  Contact: Alexander Munoz called to give an update on the message he had sent over earlier today 5/15. He will be keeping this appointment for the procedure on Friday 5/24. Please call him at  [phone]  Thanks   Am

## 2024-05-16 ENCOUNTER — TELEPHONE (OUTPATIENT)
Dept: UROLOGY | Facility: CLINIC | Age: 84
End: 2024-05-16
Payer: MEDICARE

## 2024-05-16 NOTE — TELEPHONE ENCOUNTER
Patient reports that he is going to keep scheduled appointment.     ----- Message from Raoul Rachel MD sent at 5/15/2024  7:49 AM CDT -----  Regarding: RE: Procedure  Please call the patient to reschedule his cystoscopy with right ureteral stent removal for Thursday 5-23-24 at the [facility] per the patent's request.  ----- Message -----  From: Noemí Amato MA  Sent: 5/14/2024   2:11 PM CDT  To: Raoul Rachel MD  Subject: Procedure                                        Patient called wanting to reschedule stent pull. Review previous message.  ----- Message -----  From: Rachel Bourgeois  Sent: 5/13/2024   3:25 PM CDT  To: [facility] Silveira Staff    States he has a procedure on 5/24, he would like to reschedule it on 5/23 at 2:00. Please call pt [phone]. Thank you

## 2024-05-24 ENCOUNTER — PROCEDURE VISIT (OUTPATIENT)
Dept: UROLOGY | Facility: CLINIC | Age: 84
End: 2024-05-24
Payer: MEDICARE

## 2024-05-24 DIAGNOSIS — C67.8 MALIGNANT NEOPLASM OF OVERLAPPING SITES OF BLADDER: Primary | ICD-10-CM

## 2024-05-24 DIAGNOSIS — N30.00 ACUTE CYSTITIS WITHOUT HEMATURIA: ICD-10-CM

## 2024-05-24 PROCEDURE — 52310 CYSTOSCOPY AND TREATMENT: CPT | Mod: PBBFAC | Performed by: UROLOGY

## 2024-05-24 PROCEDURE — 52310 CYSTOSCOPY AND TREATMENT: CPT | Mod: S$PBB,,, | Performed by: UROLOGY

## 2024-05-24 PROCEDURE — 99499 UNLISTED E&M SERVICE: CPT | Mod: S$PBB,,, | Performed by: UROLOGY

## 2024-05-24 PROCEDURE — 52000 CYSTOURETHROSCOPY: CPT | Mod: PBBFAC | Performed by: UROLOGY

## 2024-05-24 RX ORDER — CEFDINIR 300 MG/1
300 CAPSULE ORAL 2 TIMES DAILY
Qty: 20 CAPSULE | Refills: 0 | Status: SHIPPED | OUTPATIENT
Start: 2024-05-24 | End: 2024-06-03

## 2024-05-24 NOTE — PROCEDURES
Cystoscopy    Date/Time: 5/24/2024 2:00 PM    Performed by: Raoul Rachel MD  Authorized by: Raoul Rachel MD    Consent Done?:  Yes (Verbal) and Yes (Written)  Timeout: prior to procedure the correct patient, procedure, and site was verified    Prep: patient was prepped and draped in usual sterile fashion    Local anesthesia used?: Yes    Anesthesia:  Lidocaine jelly  Local anesthetic:  Topical anesthetic  Position:  Supine  Anesthesia:  Lidocaine jelly  Preparation: Patient was prepped and draped in usual sterile fashion    Scope type:  Flexible cystoscope   patient tolerated the procedure well with no immediate complications  Comments:      The flexible cystoscope was advanced through his urethra into his bladder.  The distal curl of his right ureteral was seen to be in good position within the lumen of his bladder.  A foreign body grasper was used to grap the distal curl of his right ureteral stent.  The patient's right ureteral stent was completely removed intact from his body.  The patient tolerated this procedure well.         11-22-23  Renal ultrasound.  No hydronephrosis or renal stone is seen bilaterally.  9 mm, 7 mm, and 6 mm adjacent simple appearing left renal cysts.  No right renal lesion is seen.     I reviewed his above imaging result.            10-13-23  PSA 0.99     4-24-24  Cr 1.1.  GFR >60.     I reviewed his above lab result.            Assessment:  - Bladder cancer in multiple sites in his bladder s/p transurethral resection of several bladder tumors, bilateral retrograde pyelograms, and right ureteral stent placement on 5-3-24.  Path of his midline posterior wall, left posterior wall, two dome tumors, and trigone near the right ureteral orifice all showed high grade papillary urothelial carcinoma, no definitive evidence of invasion, and muscularis propria is present and uninvolved by tumor.  His right ureteral stent was removed cystoscopically today on 5-24-24.  His urine was mildly  cloudy today on 5-24-24.  - He underwent induction BCG treatments (6 treatments) starting on 12-6-23.  - History of a TURBT and bladder biopsies with fulguration of 5 bladder tumors, normal bilateral RGPs, and right ureteral stent placement on 11-8-23.  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  Other biopsy locations showed high grade papillary urothelial carcinoma non invasive.    - History of bladder biopsies on 8-29-23 by Dr Moe Henning, Urologist in [place].  Path showed high grade urothelial carcinoma with no definitive invasion.  - Cystitis. He is having mild dysuria and increased frequency and urgency.    - Persistent microscopic hematuria.  - BPH without significant LUTS.  - He is a former smoker.  - CAD.  History of an MI.  He takes aspirin 81 mg and plavix.  He says that Dr Ty Suh is his Cardiologist.     Plan:  - He is scheduled to start his 2nd round of induction BCG treatments (1 treatment per week for 6 weeks).on 6-6-24.  - I prescribed him cefdinir 300 mg 1 PO BID X 10 days.  - I told him to call if his urinary symptoms do not improve with cefdinir.  - I discussed dietary modifications with him today and I recommended he drink mostly water during the day.   - Will plan a restaging TURBT in the OR approximately 6 to 8 weeks after his last BCG treatment.  - I ordered today on 5-24-24 a CT urogram to be done a few days prior to his appointment on 8-2-24.  I ordered today on 5-24-24 a creatinine to be done about 1 week prior to his CT urogram.

## 2024-06-04 ENCOUNTER — TELEPHONE (OUTPATIENT)
Dept: UROLOGY | Facility: CLINIC | Age: 84
End: 2024-06-04
Payer: MEDICARE

## 2024-06-04 NOTE — TELEPHONE ENCOUNTER
----- Message from Raoul Rachel MD sent at 5/24/2024  8:41 PM CDT -----  Please call Mr Crawford and schedule him a CT urogram to be done a few days prior to his appointment on 8-2-24.  He needs to do lab work to check a creatinine about 1 week prior to his CT urogram. I placed the CT urogram and creatinine orders.  He needs to be scheduled for 6 BCG treatments (not 4).

## 2024-06-04 NOTE — TELEPHONE ENCOUNTER
Tried contacting patient reference to scheduling Ct Urogram and Lab work before CT scan. There was no answer, left voicemail with scheduled dates, times, and call back number. Patient scheduled for CT Urogram on 07/29/2024 for 11:30AM at the [place] location and lab work scheduled previous week 07/22/2024 for 10:20AM at the [place] location.

## 2024-06-06 ENCOUNTER — OFFICE VISIT (OUTPATIENT)
Dept: UROLOGY | Facility: CLINIC | Age: 84
End: 2024-06-06
Payer: MEDICARE

## 2024-06-06 ENCOUNTER — TELEPHONE (OUTPATIENT)
Dept: UROLOGY | Facility: CLINIC | Age: 84
End: 2024-06-06

## 2024-06-06 VITALS — WEIGHT: 190.5 LBS | HEIGHT: 72 IN | RESPIRATION RATE: 14 BRPM | BODY MASS INDEX: 25.8 KG/M2

## 2024-06-06 DIAGNOSIS — C67.0 MALIGNANT NEOPLASM OF TRIGONE OF URINARY BLADDER: Primary | ICD-10-CM

## 2024-06-06 LAB
BILIRUB UR QL STRIP: NEGATIVE
GLUCOSE UR QL STRIP: NEGATIVE
KETONES UR QL STRIP: NEGATIVE
LEUKOCYTE ESTERASE UR QL STRIP: POSITIVE
PH, POC UA: 6
POC BLOOD, URINE: NEGATIVE
POC NITRATES, URINE: NEGATIVE
PROT UR QL STRIP: NEGATIVE
SP GR UR STRIP: 1.02 (ref 1–1.03)
UROBILINOGEN UR STRIP-ACNC: 0.2 (ref 0.3–2.2)

## 2024-06-06 PROCEDURE — 99213 OFFICE O/P EST LOW 20 MIN: CPT | Mod: PBBFAC | Performed by: NURSE PRACTITIONER

## 2024-06-06 PROCEDURE — 99999 PR PBB SHADOW E&M-EST. PATIENT-LVL III: CPT | Mod: PBBFAC,,, | Performed by: NURSE PRACTITIONER

## 2024-06-06 PROCEDURE — 99214 OFFICE O/P EST MOD 30 MIN: CPT | Mod: S$PBB,,, | Performed by: NURSE PRACTITIONER

## 2024-06-06 PROCEDURE — 99999PBSHW POCT URINALYSIS, DIPSTICK, AUTOMATED, W/O SCOPE: Mod: PBBFAC,,,

## 2024-06-06 PROCEDURE — 99999PBSHW PR PBB SHADOW TECHNICAL ONLY FILED TO HB: Mod: PBBFAC,,,

## 2024-06-06 PROCEDURE — 81003 URINALYSIS AUTO W/O SCOPE: CPT | Mod: PBBFAC | Performed by: NURSE PRACTITIONER

## 2024-06-06 RX ADMIN — BACILLUS CALMETTE-GUERIN 50 MG: 50 POWDER, FOR SUSPENSION INTRAVESICAL at 02:06

## 2024-06-06 NOTE — TELEPHONE ENCOUNTER
Pt in today for BCG #1/6.  Reports dysuria for more than a week and states he had no improvement after taking last antibiotics. POCT positive for blood and small leukocytes; presented this to Dr. Correa and was told to proceed with BCG; however, pt wanted to make MD aware.

## 2024-06-06 NOTE — PROGRESS NOTES
.....Patient in today for BCG treatment  #1/6. POCT indicated positive blood and small leukocytes.  Pt reports burning upon urination but denies seeing blood.  Nurse consulted with Dr. Correa and was told to administer medication.  One vial of BCG and 50ml perservative free sodium chloride 0.9% mixed as per instructions.  Using aseptic technique, a sterile fenestrated drape was placed over penis and perineal area cleansed with betadine.  Pt was catheterized using a #14Fr red rubber catheter to allow bladder emptying.  Using closed system, one vial BCG instilled via gravity directly into bladder.  Pt tolerated well.  Instructed pt to keep BCG solution in bladder for 2 hours.  Pt was given instructions to follow for the next 6 hours, including sitting on his toilet at home and using 2 cups of undiluted chlorine bleach and closing the lid.  Pt was told to allow bleach to stand for 15 minutes before flushing toilet.  He was also told to drink plenty of water to flush any remaining BCG bacteria.  Patient verbalized understanding.

## 2024-06-06 NOTE — PROGRESS NOTES
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  Urine in clinic is negative and  patient denies gross hematuria or dysuria. .  Allergies:  Patient has no known allergies.    Medications:  has a current medication list which includes the following prescription(s): aspirin, clopidogrel, gabapentin, glipizide, hyoscyamine, isosorbide mononitrate, losartan-hydrochlorothiazide 50-12.5 mg, metformin, and simvastatin, and the following Facility-Administered Medications: [START ON 6/7/2024] BCG live (PILO) 50 mg in sodium chloride 0.9% 50 mL bladder instillation.    Review of Systems:  General: No fever, chills, fatigability, or weight loss.  Skin: No rashes, itching, or changes in color or texture of skin.  Chest: Denies ARDON, cyanosis, wheezing, cough, and sputum production.  Abdomen: Appetite fine. No weight loss. Denies diarrhea, abdominal pain, hematemesis, or blood in stool.  Musculoskeletal: No joint stiffness or swelling. Denies back pain.  : As above.  All other review of systems negative.    PMH:   has a past medical history of Bladder cancer, CAD (coronary artery disease), CVA (cerebral vascular accident), Diabetes mellitus, HLD (hyperlipidemia), Hypertension, and Myocardial infarction.    PSH:   has a past surgical history that includes Back surgery; Coronary angioplasty; Carotid endarterectomy (Bilateral); Cystoscopy; turbt (transurethral resection of bladder tumor); turbt (transurethral resection of bladder tumor) (Bilateral, 5/3/2024); Cystoscopy w/ ureteral stent placement (Right, 5/3/2024); and Retrograde pyelography (Bilateral, 5/3/2024).    FamHx: family history includes Lung cancer in his father.    SocHx:  reports that he has quit smoking. His smoking use included cigarettes. He has never used  smokeless tobacco. He reports that he does not currently use alcohol. He reports that he does not use drugs.      Physical Exam:  Vitals:    06/06/24 1407   Resp: 14     General: A&Ox3, no apparent distress, no deformities  Neck: No masses, normal thyroid  Lungs: normal inspiration, no use of accessory muscles  Heart: normal pulse, no arrhythmias  Abdomen: Soft, NT, ND, no masses, no hernias, no hepatosplenomegaly  Lymphatic: Neck and groin nodes negative  Skin: The skin is warm and dry. No jaundice.    Labs/Studies:     See HPI    Impression/Plan:   1/6  BCG treatment   See nursing note

## 2024-06-07 ENCOUNTER — TELEPHONE (OUTPATIENT)
Dept: UROLOGY | Facility: CLINIC | Age: 84
End: 2024-06-07
Payer: MEDICARE

## 2024-06-10 ENCOUNTER — TELEPHONE (OUTPATIENT)
Dept: UROLOGY | Facility: CLINIC | Age: 84
End: 2024-06-10
Payer: MEDICARE

## 2024-06-10 DIAGNOSIS — N30.00 ACUTE CYSTITIS WITHOUT HEMATURIA: Primary | ICD-10-CM

## 2024-06-10 RX ORDER — SULFAMETHOXAZOLE AND TRIMETHOPRIM 800; 160 MG/1; MG/1
1 TABLET ORAL 2 TIMES DAILY
Qty: 20 TABLET | Refills: 0 | Status: SHIPPED | OUTPATIENT
Start: 2024-06-10

## 2024-06-10 NOTE — TELEPHONE ENCOUNTER
Called and left message for pt informing him that Dr. Rachel agreed with a urine culture today.   Also told pt that that Bactrim DS  was sent to his pharmacy; he should take 1 by mouth twice daily for 10 days. Told pt to drink plenty of water and that we would skip the BCG for this week. drink plenty of water.

## 2024-06-10 NOTE — TELEPHONE ENCOUNTER
Received call from pt stating that he experienced some side effects from him most recent BCG treatment. States he had chills, nausea and malaise; reports never having an issue with previous BCG treatments.  Instructed pt that we would do a urine culture today and most probably will hold his next BCG treatment.

## 2024-06-12 ENCOUNTER — TELEPHONE (OUTPATIENT)
Dept: UROLOGY | Facility: CLINIC | Age: 84
End: 2024-06-12
Payer: MEDICARE

## 2024-06-12 NOTE — TELEPHONE ENCOUNTER
Returned call to pt; states he did not get the vm I left for him on 6/10/24 instructing him to go to the [place] lab to do a Urine Culture and that Dr. Rachel sent in antibiotics for him to take; pt states that he did go to the lab and did the urine culture; pt instructed to  the antibiotics and start taking them immediately and that I would call the lab to see if any results were ready.  Will skip this week's BCG treatment and resume on 6/20/24.  Pt verbalized understanding.

## 2024-06-12 NOTE — TELEPHONE ENCOUNTER
Received urine culture results from [facility] lab; no growth after 48 hours;  left results on pts voice mail.  MD notified as well.

## 2024-06-20 ENCOUNTER — OFFICE VISIT (OUTPATIENT)
Dept: UROLOGY | Facility: CLINIC | Age: 84
End: 2024-06-20
Payer: MEDICARE

## 2024-06-20 VITALS — HEIGHT: 73 IN | RESPIRATION RATE: 14 BRPM | WEIGHT: 190.5 LBS | BODY MASS INDEX: 25.25 KG/M2

## 2024-06-20 DIAGNOSIS — C67.0 MALIGNANT NEOPLASM OF TRIGONE OF URINARY BLADDER: Primary | ICD-10-CM

## 2024-06-20 LAB
BILIRUB UR QL STRIP: NEGATIVE
GLUCOSE UR QL STRIP: NEGATIVE
KETONES UR QL STRIP: NEGATIVE
LEUKOCYTE ESTERASE UR QL STRIP: NEGATIVE
PH, POC UA: 6
POC BLOOD, URINE: NEGATIVE
POC NITRATES, URINE: NEGATIVE
PROT UR QL STRIP: NEGATIVE
SP GR UR STRIP: 1.03 (ref 1–1.03)
UROBILINOGEN UR STRIP-ACNC: 0.2 (ref 0.3–2.2)

## 2024-06-20 PROCEDURE — 99999PBSHW PR PBB SHADOW TECHNICAL ONLY FILED TO HB: Mod: PBBFAC,,,

## 2024-06-20 PROCEDURE — 99999PBSHW POCT URINALYSIS, DIPSTICK, AUTOMATED, W/O SCOPE: Mod: PBBFAC,,,

## 2024-06-20 PROCEDURE — 99214 OFFICE O/P EST MOD 30 MIN: CPT | Mod: S$PBB,,, | Performed by: NURSE PRACTITIONER

## 2024-06-20 PROCEDURE — 99999 PR PBB SHADOW E&M-EST. PATIENT-LVL III: CPT | Mod: PBBFAC,,, | Performed by: NURSE PRACTITIONER

## 2024-06-20 PROCEDURE — 99213 OFFICE O/P EST LOW 20 MIN: CPT | Mod: PBBFAC | Performed by: NURSE PRACTITIONER

## 2024-06-20 PROCEDURE — 81003 URINALYSIS AUTO W/O SCOPE: CPT | Mod: PBBFAC | Performed by: NURSE PRACTITIONER

## 2024-06-20 RX ORDER — PHENAZOPYRIDINE HYDROCHLORIDE 100 MG/1
200 TABLET, FILM COATED ORAL 3 TIMES DAILY PRN
Qty: 30 TABLET | Refills: 3 | Status: SHIPPED | OUTPATIENT
Start: 2024-06-20 | End: 2024-06-30

## 2024-06-20 RX ADMIN — BACILLUS CALMETTE-GUERIN 50 MG: 50 POWDER, FOR SUSPENSION INTRAVESICAL at 03:06

## 2024-06-20 NOTE — PROGRESS NOTES
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  Urine in clinic is negative and  patient denies gross hematuria. Slight burning.  Allergies:  Patient has no known allergies.     Medications:  has a current medication list which includes the following prescription(s): aspirin, clopidogrel, gabapentin, glipizide, hyoscyamine, isosorbide mononitrate, losartan-hydrochlorothiazide 50-12.5 mg, metformin, and simvastatin, and the following Facility-Administered Medications: [START ON 6/7/2024] BCG live (PILO) 50 mg in sodium chloride 0.9% 50 mL bladder instillation.     Review of Systems:  General: No fever, chills, fatigability, or weight loss.  Skin: No rashes, itching, or changes in color or texture of skin.  Chest: Denies ARDON, cyanosis, wheezing, cough, and sputum production.  Abdomen: Appetite fine. No weight loss. Denies diarrhea, abdominal pain, hematemesis, or blood in stool.  Musculoskeletal: No joint stiffness or swelling. Denies back pain.  : As above.  All other review of systems negative.     PMH:   has a past medical history of Bladder cancer, CAD (coronary artery disease), CVA (cerebral vascular accident), Diabetes mellitus, HLD (hyperlipidemia), Hypertension, and Myocardial infarction.     PSH:   has a past surgical history that includes Back surgery; Coronary angioplasty; Carotid endarterectomy (Bilateral); Cystoscopy; turbt (transurethral resection of bladder tumor); turbt (transurethral resection of bladder tumor) (Bilateral, 5/3/2024); Cystoscopy w/ ureteral stent placement (Right, 5/3/2024); and Retrograde pyelography (Bilateral, 5/3/2024).     FamHx: family history includes Lung cancer in his father.     SocHx:  reports that he has quit smoking. His smoking use included cigarettes. He has  never used smokeless tobacco. He reports that he does not currently use alcohol. He reports that he does not use drugs.       Physical Exam:      Vitals:     122/80   Resp: 20      General: A&Ox3, no apparent distress, no deformities  Neck: No masses, normal thyroid  Lungs: normal inspiration, no use of accessory muscles  Heart: normal pulse, no arrhythmias  Abdomen: Soft, NT, ND, no masses, no hernias, no hepatosplenomegaly  Lymphatic: Neck and groin nodes negative  Skin: The skin is warm and dry. No jaundice.     Labs/Studies:     See HPI     Impression/Plan:   2/6  BCG treatment  Dr. Rachel aware of slight burning, Pyridium script sent to his pharmacy   See nursing note

## 2024-06-20 NOTE — PROGRESS NOTES
.....Patient in today for BCG treatment  #2/6.  Poct neg for blood and trace leukocytes;pt c/o dysuria but reports he has not seen any blood; message sent to Dr. Rachel for advise and was told to proceed with treatment.One vial of BCG and 50ml perservative free sodium chloride 0.9% mixed as per instructions.  Using aseptic technique, a sterile fenestrated drape was placed over penis and perineal area cleansed with betadine.  Pt was catheterized using a #14Fr red rubber catheter to allow bladder emptying.  Using closed system, one vial BCG instilled via gravity directly into bladder.  Pt tolerated well.  Instructed pt to keep BCG solution in bladder for 2 hours.  Pt was given instructions to follow for the next 6 hours, including sitting on his toilet at home and using 2 cups of undiluted chlorine bleach and closing this lid.  Pt was told to allow bleach to stand for 15 minutes before flushing toilet.  He was also told to drink plenty of water to flush any remaining BCG bacteria.  Patient verbalized understanding.

## 2024-06-21 ENCOUNTER — TELEPHONE (OUTPATIENT)
Dept: UROLOGY | Facility: CLINIC | Age: 84
End: 2024-06-21
Payer: MEDICARE

## 2024-06-21 NOTE — TELEPHONE ENCOUNTER
Attempted to call pt regarding the Rx for Pyridium; no answer; we received a message saying the medication was not sent to his pharmacy; I called and spoke to the pharmacist and was told that his Rx was available and ready for pickup; however, that medication is also available OTC and the cost was much cheaper.  Left message with these details on pts voice mail.

## 2024-06-27 ENCOUNTER — OFFICE VISIT (OUTPATIENT)
Dept: UROLOGY | Facility: CLINIC | Age: 84
End: 2024-06-27
Payer: MEDICARE

## 2024-06-27 VITALS — RESPIRATION RATE: 14 BRPM | BODY MASS INDEX: 25.25 KG/M2 | WEIGHT: 190.5 LBS | HEIGHT: 73 IN

## 2024-06-27 DIAGNOSIS — C67.0 MALIGNANT NEOPLASM OF TRIGONE OF URINARY BLADDER: Primary | ICD-10-CM

## 2024-06-27 PROCEDURE — 99213 OFFICE O/P EST LOW 20 MIN: CPT | Mod: PBBFAC | Performed by: NURSE PRACTITIONER

## 2024-06-27 PROCEDURE — 99999 PR PBB SHADOW E&M-EST. PATIENT-LVL III: CPT | Mod: PBBFAC,,, | Performed by: NURSE PRACTITIONER

## 2024-06-27 PROCEDURE — 99999PBSHW PR PBB SHADOW TECHNICAL ONLY FILED TO HB: Mod: PBBFAC,,,

## 2024-06-27 NOTE — PROGRESS NOTES
....Patient in today for BCG treatment.  #3/6.  One vial of BCG and 50ml perservative free sodium chloride 0.9% mixed as per instructions.  Using aseptic technique, a sterile fenestrated drape was placed over penis.  Pt was catheterized using a #14Fr red rubber catheter to allow bladder emptying.  Private area cleansed with betadine.  Using closed system, BCG instilled via gravity directly into bladder.  Pt tolerated well.  Instructed pt to keep BCG solution in bladder for 2 hours.  Pt was given instructions to follow for the next 6 hours, including sitting on his toilet at home and using 2 cups of undiluted chlorine bleach and closing the lid.  Pt was told to allow bleach to stand for 15 minutes before flushing toilet.  He was also told to drink plenty of water to flush any remaining BCG bacteria.  Patient verbalized understanding.

## 2024-06-27 NOTE — PROGRESS NOTES
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  Urine in clinic is negative and  patient denies gross hematuria.   Allergies:  Patient has no known allergies.     Medications:  has a current medication list which includes the following prescription(s): aspirin, clopidogrel, gabapentin, glipizide, hyoscyamine, isosorbide mononitrate, losartan-hydrochlorothiazide 50-12.5 mg, metformin, and simvastatin, and the following Facility-Administered Medications: [START ON 6/7/2024] BCG live (PILO) 50 mg in sodium chloride 0.9% 50 mL bladder instillation.     Review of Systems:  General: No fever, chills, fatigability, or weight loss.  Skin: No rashes, itching, or changes in color or texture of skin.  Chest: Denies ARDON, cyanosis, wheezing, cough, and sputum production.  Abdomen: Appetite fine. No weight loss. Denies diarrhea, abdominal pain, hematemesis, or blood in stool.  Musculoskeletal: No joint stiffness or swelling. Denies back pain.  : As above.  All other review of systems negative.     PMH:   has a past medical history of Bladder cancer, CAD (coronary artery disease), CVA (cerebral vascular accident), Diabetes mellitus, HLD (hyperlipidemia), Hypertension, and Myocardial infarction.     PSH:   has a past surgical history that includes Back surgery; Coronary angioplasty; Carotid endarterectomy (Bilateral); Cystoscopy; turbt (transurethral resection of bladder tumor); turbt (transurethral resection of bladder tumor) (Bilateral, 5/3/2024); Cystoscopy w/ ureteral stent placement (Right, 5/3/2024); and Retrograde pyelography (Bilateral, 5/3/2024).     FamHx: family history includes Lung cancer in his father.     SocHx:  reports that he has quit smoking. His smoking use included cigarettes. He has never used  smokeless tobacco. He reports that he does not currently use alcohol. He reports that he does not use drugs.       Physical Exam:   General: A&Ox3, no apparent distress, no deformities  Neck: No masses, normal thyroid  Lungs: normal inspiration, no use of accessory muscles  Heart: normal pulse, no arrhythmias  Abdomen: Soft, NT, ND, no masses, no hernias, no hepatosplenomegaly  Lymphatic: Neck and groin nodes negative  Skin: The skin is warm and dry. No jaundice.     Labs/Studies:     See HPI     Impression/Plan:   3/6  BCG treatmen   See nursing note

## 2024-07-08 ENCOUNTER — TELEPHONE (OUTPATIENT)
Dept: UROLOGY | Facility: CLINIC | Age: 84
End: 2024-07-08
Payer: MEDICARE

## 2024-07-08 NOTE — TELEPHONE ENCOUNTER
Left Voicemail for patient that his BCG had to be rescheduled to tomorrow at 1:40 pm.    Patsy Brannon LPN

## 2024-07-10 ENCOUNTER — OFFICE VISIT (OUTPATIENT)
Dept: UROLOGY | Facility: CLINIC | Age: 84
End: 2024-07-10
Payer: MEDICARE

## 2024-07-10 DIAGNOSIS — C67.0 MALIGNANT NEOPLASM OF TRIGONE OF URINARY BLADDER: Primary | ICD-10-CM

## 2024-07-10 LAB
BILIRUB UR QL STRIP: NEGATIVE
GLUCOSE UR QL STRIP: NEGATIVE
KETONES UR QL STRIP: NEGATIVE
LEUKOCYTE ESTERASE UR QL STRIP: NEGATIVE
PH, POC UA: 7
POC BLOOD, URINE: NEGATIVE
POC NITRATES, URINE: NEGATIVE
PROT UR QL STRIP: POSITIVE
SP GR UR STRIP: 1.02 (ref 1–1.03)
UROBILINOGEN UR STRIP-ACNC: 0.2 (ref 0.3–2.2)

## 2024-07-10 PROCEDURE — 99214 OFFICE O/P EST MOD 30 MIN: CPT | Mod: S$PBB,,, | Performed by: NURSE PRACTITIONER

## 2024-07-10 PROCEDURE — 99999 PR PBB SHADOW E&M-EST. PATIENT-LVL II: CPT | Mod: PBBFAC,,, | Performed by: NURSE PRACTITIONER

## 2024-07-10 PROCEDURE — 99212 OFFICE O/P EST SF 10 MIN: CPT | Mod: PBBFAC | Performed by: NURSE PRACTITIONER

## 2024-07-10 PROCEDURE — 81003 URINALYSIS AUTO W/O SCOPE: CPT | Mod: PBBFAC | Performed by: NURSE PRACTITIONER

## 2024-07-10 PROCEDURE — 99999PBSHW PR PBB SHADOW TECHNICAL ONLY FILED TO HB: Mod: PBBFAC,,,

## 2024-07-10 PROCEDURE — 99999PBSHW POCT URINALYSIS, DIPSTICK, AUTOMATED, W/O SCOPE: Mod: PBBFAC,,,

## 2024-07-10 RX ADMIN — BACILLUS CALMETTE-GUERIN 50 MG: 50 POWDER, FOR SUSPENSION INTRAVESICAL at 02:07

## 2024-07-10 NOTE — PROGRESS NOTES
.Patient in today for BCG treatment #4/6. One vial of BCG and 50 ml preservative free sodium chloride 0.9% mixed as per instructions. Using aseptic technique, a sterile fenestrated drape was place over perineum. Pt was catheterized using a #14Fr red rubber catheter to allow bladder emptying. Using closed system, one vial of BCG instilled via gravity directly into bladder. Pt tolerated well. Instructed to keep BCG solution in the bladder for 2 hours. Pt was given instructions to follow for the next 6 hours, including sitting on toilet at home and using 2 cups of undiluted chlorine bleach and closing the lid. Pt was told to allow bleach to stand for 15 minutes before flushing toilet. Pt was also told to drink plenty of water to flush any remaining BCG bacteria. Pt verbalized understanding.     Loraine Milanes RN

## 2024-07-18 ENCOUNTER — TELEPHONE (OUTPATIENT)
Dept: UROLOGY | Facility: CLINIC | Age: 84
End: 2024-07-18
Payer: MEDICARE

## 2024-07-18 NOTE — TELEPHONE ENCOUNTER
I contacted pt and informed him that I faxed over his lab orders to [facility] as he requested.

## 2024-07-22 ENCOUNTER — PATIENT MESSAGE (OUTPATIENT)
Dept: UROLOGY | Facility: CLINIC | Age: 84
End: 2024-07-22
Payer: MEDICARE

## 2024-07-22 ENCOUNTER — TELEPHONE (OUTPATIENT)
Dept: UROLOGY | Facility: CLINIC | Age: 84
End: 2024-07-22
Payer: MEDICARE

## 2024-07-22 NOTE — TELEPHONE ENCOUNTER
Returned call to pt; left  informing him that the appt scheduled for Aug 2 had to be canceled as pt has 2 more BCG treatments.  After the last BCG treatment, we will schedule him to followup with Dr. Rachel.

## 2024-08-09 ENCOUNTER — CLINICAL SUPPORT (OUTPATIENT)
Dept: UROLOGY | Facility: CLINIC | Age: 84
End: 2024-08-09
Payer: MEDICARE

## 2024-08-09 VITALS — BODY MASS INDEX: 25.25 KG/M2 | HEIGHT: 73 IN | RESPIRATION RATE: 14 BRPM | WEIGHT: 190.5 LBS

## 2024-08-09 DIAGNOSIS — C67.0 MALIGNANT NEOPLASM OF TRIGONE OF URINARY BLADDER: Primary | ICD-10-CM

## 2024-08-09 PROCEDURE — 99213 OFFICE O/P EST LOW 20 MIN: CPT | Mod: PBBFAC | Performed by: UROLOGY

## 2024-08-09 PROCEDURE — 99999 PR PBB SHADOW E&M-EST. PATIENT-LVL III: CPT | Mod: PBBFAC,,, | Performed by: UROLOGY

## 2024-08-19 ENCOUNTER — OFFICE VISIT (OUTPATIENT)
Dept: UROLOGY | Facility: CLINIC | Age: 84
End: 2024-08-19
Payer: MEDICARE

## 2024-08-19 VITALS
BODY MASS INDEX: 23.92 KG/M2 | HEIGHT: 73 IN | TEMPERATURE: 98 F | SYSTOLIC BLOOD PRESSURE: 126 MMHG | RESPIRATION RATE: 18 BRPM | HEART RATE: 66 BPM | DIASTOLIC BLOOD PRESSURE: 64 MMHG | WEIGHT: 180.44 LBS

## 2024-08-19 DIAGNOSIS — C67.0 MALIGNANT NEOPLASM OF TRIGONE OF URINARY BLADDER: Primary | ICD-10-CM

## 2024-08-19 PROCEDURE — 99999PBSHW PR PBB SHADOW TECHNICAL ONLY FILED TO HB: Mod: PBBFAC,,,

## 2024-08-19 PROCEDURE — 99214 OFFICE O/P EST MOD 30 MIN: CPT | Mod: S$PBB,,, | Performed by: NURSE PRACTITIONER

## 2024-08-19 PROCEDURE — 99213 OFFICE O/P EST LOW 20 MIN: CPT | Mod: PBBFAC | Performed by: NURSE PRACTITIONER

## 2024-08-19 PROCEDURE — 99999 PR PBB SHADOW E&M-EST. PATIENT-LVL III: CPT | Mod: PBBFAC,,, | Performed by: NURSE PRACTITIONER

## 2024-08-19 RX ADMIN — BACILLUS CALMETTE-GUERIN 50 MG: 50 POWDER, FOR SUSPENSION INTRAVESICAL at 04:08

## 2024-08-19 NOTE — PROGRESS NOTES
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  Urine in clinic is negative and  patient denies gross hematuria.   Allergies:  Patient has no known allergies.     Medications:  has a current medication list which includes the following prescription(s): aspirin, clopidogrel, gabapentin, glipizide, hyoscyamine, isosorbide mononitrate, losartan-hydrochlorothiazide 50-12.5 mg, metformin, and simvastatin, and the following Facility-Administered Medications: [START ON 6/7/2024] BCG live (PILO) 50 mg in sodium chloride 0.9% 50 mL bladder instillation.     Review of Systems:  General: No fever, chills, fatigability, or weight loss.  Skin: No rashes, itching, or changes in color or texture of skin.  Chest: Denies ARDON, cyanosis, wheezing, cough, and sputum production.  Abdomen: Appetite fine. No weight loss. Denies diarrhea, abdominal pain, hematemesis, or blood in stool.  Musculoskeletal: No joint stiffness or swelling. Denies back pain.  : As above.  All other review of systems negative.     PMH:   has a past medical history of Bladder cancer, CAD (coronary artery disease), CVA (cerebral vascular accident), Diabetes mellitus, HLD (hyperlipidemia), Hypertension, and Myocardial infarction.     PSH:   has a past surgical history that includes Back surgery; Coronary angioplasty; Carotid endarterectomy (Bilateral); Cystoscopy; turbt (transurethral resection of bladder tumor); turbt (transurethral resection of bladder tumor) (Bilateral, 5/3/2024); Cystoscopy w/ ureteral stent placement (Right, 5/3/2024); and Retrograde pyelography (Bilateral, 5/3/2024).     FamHx: family history includes Lung cancer in his father.     SocHx:  reports that he has quit smoking. His smoking use included cigarettes. He has never used smokeless tobacco. He  reports that he does not currently use alcohol. He reports that he does not use drugs.       Physical Exam:   General: A&Ox3, no apparent distress, no deformities  Neck: No masses, normal thyroid  Lungs: normal inspiration, no use of accessory muscles  Heart: normal pulse, no arrhythmias  Abdomen: Soft, NT, ND, no masses, no hernias, no hepatosplenomegaly  Lymphatic: Neck and groin nodes negative  Skin: The skin is warm and dry. No jaundice.     Labs/Studies:     See HPI     Impression/Plan:   BCG treatment  See nursing note

## 2024-08-19 NOTE — PROGRESS NOTES
......Patient in today for BCG treatment.  #6/6.  One vial of BCG and 50ml perservative free sodium chloride 0.9% mixed as per instructions.  Using aseptic technique, a sterile fenestrated drape was placed over penis.  Pt was catheterized using a #14Fr red rubber catheter to allow bladder emptying.  Using closed system, BCG  instilled via gravity directly into bladder.  Pt tolerated well.  Instructed pt to keep BCG solution in bladder for 2 hours.  Pt was given instructions to follow for the next 6 hours, including sitting on his toilet at home and using 2 cups of undiluted chlorine bleach and closing this lid.  Pt was told to allow bleach to stand for 15 minutes before flushing toilet.  He was also told to drink plenty of water to flush any remaining BCG bacteria.  Patient verbalized understanding.  Will contact Dr. Rachel to see when pt should return for cystoscopy.

## 2024-09-25 ENCOUNTER — TELEPHONE (OUTPATIENT)
Dept: UROLOGY | Facility: CLINIC | Age: 84
End: 2024-09-25
Payer: MEDICARE

## 2024-09-25 NOTE — TELEPHONE ENCOUNTER
Called patient and after verifying name and date of birth rescheduled patient's appointment to 10/02/2024 at 1 pm. Patient voiced understanding.    Patsy Brannon LPN    ----- Message from Ramon Wilson MA sent at 9/25/2024  3:25 PM CDT -----  The patient calling to speak with staff about today's appointment. Says he is unable to come today due to something coming up but next available not until November. Want to know if the provider is okay with him waiting that long or is there something sooner. Please give him a call back at [phone]      Denied seeing a NP.

## 2024-10-02 ENCOUNTER — OFFICE VISIT (OUTPATIENT)
Dept: UROLOGY | Facility: CLINIC | Age: 84
End: 2024-10-02
Payer: MEDICARE

## 2024-10-02 ENCOUNTER — LAB VISIT (OUTPATIENT)
Dept: LAB | Facility: HOSPITAL | Age: 84
End: 2024-10-02
Attending: UROLOGY
Payer: MEDICARE

## 2024-10-02 VITALS
HEART RATE: 67 BPM | DIASTOLIC BLOOD PRESSURE: 65 MMHG | WEIGHT: 180.31 LBS | BODY MASS INDEX: 23.79 KG/M2 | SYSTOLIC BLOOD PRESSURE: 124 MMHG

## 2024-10-02 DIAGNOSIS — N30.00 ACUTE CYSTITIS WITHOUT HEMATURIA: ICD-10-CM

## 2024-10-02 DIAGNOSIS — R30.0 DYSURIA: ICD-10-CM

## 2024-10-02 DIAGNOSIS — C67.8 MALIGNANT NEOPLASM OF OVERLAPPING SITES OF BLADDER: Primary | ICD-10-CM

## 2024-10-02 DIAGNOSIS — R63.4 WEIGHT LOSS: ICD-10-CM

## 2024-10-02 DIAGNOSIS — C67.8 MALIGNANT NEOPLASM OF OVERLAPPING SITES OF BLADDER: ICD-10-CM

## 2024-10-02 DIAGNOSIS — N40.0 BPH WITHOUT URINARY OBSTRUCTION: ICD-10-CM

## 2024-10-02 LAB
ALBUMIN SERPL BCP-MCNC: 4 G/DL (ref 3.5–5.2)
ALP SERPL-CCNC: 61 U/L (ref 55–135)
ALT SERPL W/O P-5'-P-CCNC: 8 U/L (ref 10–44)
ANION GAP SERPL CALC-SCNC: 6 MMOL/L (ref 8–16)
AST SERPL-CCNC: 13 U/L (ref 10–40)
BILIRUB SERPL-MCNC: 0.6 MG/DL (ref 0.1–1)
BILIRUB UR QL STRIP: NEGATIVE
BUN SERPL-MCNC: 20 MG/DL (ref 8–23)
CALCIUM SERPL-MCNC: 9.5 MG/DL (ref 8.7–10.5)
CHLORIDE SERPL-SCNC: 106 MMOL/L (ref 95–110)
CO2 SERPL-SCNC: 26 MMOL/L (ref 23–29)
COMPLEXED PSA SERPL-MCNC: 1.4 NG/ML (ref 0–4)
CREAT SERPL-MCNC: 1.6 MG/DL (ref 0.5–1.4)
EST. GFR  (NO RACE VARIABLE): 42.5 ML/MIN/1.73 M^2
GLUCOSE SERPL-MCNC: 142 MG/DL (ref 70–110)
GLUCOSE UR QL STRIP: NEGATIVE
KETONES UR QL STRIP: NEGATIVE
LEUKOCYTE ESTERASE UR QL STRIP: POSITIVE
PH, POC UA: 6
POC BLOOD, URINE: POSITIVE
POC NITRATES, URINE: NEGATIVE
POTASSIUM SERPL-SCNC: 4.9 MMOL/L (ref 3.5–5.1)
PROT SERPL-MCNC: 7 G/DL (ref 6–8.4)
PROT UR QL STRIP: POSITIVE
SODIUM SERPL-SCNC: 138 MMOL/L (ref 136–145)
SP GR UR STRIP: 1.02 (ref 1–1.03)
UROBILINOGEN UR STRIP-ACNC: 0.2 (ref 0.3–2.2)

## 2024-10-02 PROCEDURE — 1159F MED LIST DOCD IN RCRD: CPT | Mod: CPTII,S$GLB,, | Performed by: UROLOGY

## 2024-10-02 PROCEDURE — 81003 URINALYSIS AUTO W/O SCOPE: CPT | Mod: QW,S$GLB,, | Performed by: UROLOGY

## 2024-10-02 PROCEDURE — 1126F AMNT PAIN NOTED NONE PRSNT: CPT | Mod: CPTII,S$GLB,, | Performed by: UROLOGY

## 2024-10-02 PROCEDURE — 3288F FALL RISK ASSESSMENT DOCD: CPT | Mod: CPTII,S$GLB,, | Performed by: UROLOGY

## 2024-10-02 PROCEDURE — 84153 ASSAY OF PSA TOTAL: CPT | Performed by: UROLOGY

## 2024-10-02 PROCEDURE — 99214 OFFICE O/P EST MOD 30 MIN: CPT | Mod: S$GLB,,, | Performed by: UROLOGY

## 2024-10-02 PROCEDURE — 80053 COMPREHEN METABOLIC PANEL: CPT | Performed by: UROLOGY

## 2024-10-02 PROCEDURE — 99999 PR PBB SHADOW E&M-EST. PATIENT-LVL III: CPT | Mod: PBBFAC,,, | Performed by: UROLOGY

## 2024-10-02 PROCEDURE — 36415 COLL VENOUS BLD VENIPUNCTURE: CPT | Performed by: UROLOGY

## 2024-10-02 PROCEDURE — 87086 URINE CULTURE/COLONY COUNT: CPT | Performed by: UROLOGY

## 2024-10-02 PROCEDURE — 1101F PT FALLS ASSESS-DOCD LE1/YR: CPT | Mod: CPTII,S$GLB,, | Performed by: UROLOGY

## 2024-10-02 PROCEDURE — 3074F SYST BP LT 130 MM HG: CPT | Mod: CPTII,S$GLB,, | Performed by: UROLOGY

## 2024-10-02 PROCEDURE — 3078F DIAST BP <80 MM HG: CPT | Mod: CPTII,S$GLB,, | Performed by: UROLOGY

## 2024-10-02 NOTE — PROGRESS NOTES
Subjective:       Patient ID: Alexander Crawford is a 84 y.o. male.    Chief Complaint: Consult (BCG F/U )      History of Present Illness:     Mr Crawford has bladder cancer in multiple sites in his bladder and is s/p transurethral resection of several bladder tumors, bilateral retrograde pyelograms, and right ureteral stent placement by me on 5-3-24.  Path of his midline posterior wall, left posterior wall, two dome tumors, and trigone near the right ureteral orifice all showed high grade papillary urothelial carcinoma, no definitive evidence of invasion, and muscularis propria is present and uninvolved by tumor.  His right ureteral stent was removed cystoscopically on 5-24-24.      He has a history of bladder biopsies on 8-29-23 by Dr Moe Henning, Urologist in [place].  Path showed high grade urothelial carcinoma with no definitive invasion.    He had a history of a TURBT and bladder biopsies with fulguration of 5 bladder tumors, normal bilateral RGPs, and right ureteral stent placement by me on 11-8-23.  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  Other biopsy locations showed high grade papillary urothelial carcinoma non invasive.      He underwent 6 weeks of induction BCG treatments starting on 12-6-23.  He underwent a second round of 6 weeks of BCG treatments from 6-6-24 to 8-19-24.    He says that he has had a 20 pound unintentional weight loss this year per the patient.  He has mild intermittent dysuria at the end of urination.   No gross hematuria.  Normal urinary flow.         Past Medical History:   Diagnosis Date    Bladder cancer     CAD (coronary artery disease)     CVA (cerebral vascular accident)     Diabetes mellitus     HLD (hyperlipidemia)     Hypertension     Myocardial infarction      Family History   Problem Relation Name Age of Onset    Lung cancer Father       Social History      Socioeconomic History    Marital status:    Tobacco Use    Smoking status: Former     Types: Cigarettes    Smokeless tobacco: Never    Tobacco comments:     Quit 30 yrs ago.    Substance and Sexual Activity    Alcohol use: Not Currently     Comment: Ocassionally drinks beer.    Drug use: Never     Social Drivers of Health     Financial Resource Strain: Low Risk  (10/31/2023)    Received from [facility], [facility]    Overall Financial Resource Strain (CARDIA)     Difficulty of Paying Living Expenses: Not very hard   Food Insecurity: No Food Insecurity (10/31/2023)    [facility]    Hunger Vital Sign     Worried About Running Out of Food in the Last Year: Never true     Ran Out of Food in the Last Year: Never true   Transportation Needs: No Transportation Needs (10/31/2023)    [facility]    PRAPARE - Transportation     Lack of Transportation (Medical): No     Lack of Transportation (Non-Medical): No   Physical Activity: Insufficiently Active (10/31/2023)    Received from [facility], [facility]    Exercise Vital Sign     Days of Exercise per Week: 3 days     Minutes of Exercise per Session: 30 min   Housing Stability: Unknown (10/31/2023)    [facility], [facility]  [facility] Subsidi[facility] and Affiliates    Housing Stability Vital Sign     Number of Places Lived in the Last Year: 1     Outpatient Encounter Medications as of 10/2/2024   Medication Sig Dispense Refill    aspirin (ECOTRIN) 81 MG EC tablet Take 1 tablet by mouth every morning.      clopidogreL (PLAVIX) 75 mg tablet Take 1 tablet by mouth every morning.      gabapentin (NEURONTIN) 300 MG capsule Take 300 mg by mouth Daily.      glipiZIDE (GLUCOTROL) 5 MG tablet Take 5 mg by mouth 2 (two) times daily.      hyoscyamine (LEVSIN) 0.125 mg Subl Place 1 tablet (0.125 mg total) under the tongue every 6 (six) hours as needed (Take 1 under the tongue every 6 hours as needed for bladder spasms). 30 tablet 1    isosorbide mononitrate (IMDUR) 120 MG 24 hr tablet Take 120 mg by mouth once daily.      losartan-hydrochlorothiazide 50-12.5 mg (HYZAAR) 50-12.5 mg per tablet Take 1 tablet by mouth once daily.      metFORMIN (GLUCOPHAGE) 1000 MG tablet Take 1,000 mg by mouth 2 (two) times daily.      simvastatin (ZOCOR) 20 MG tablet Take 20 mg by mouth.      sulfamethoxazole-trimethoprim 800-160mg (BACTRIM DS) 800-160 mg Tab Take 1 tablet by mouth 2 (two) times daily. 20 tablet 0     Facility-Administered Encounter Medications as of 10/2/2024   Medication Dose Route Frequency Provider Last Rate Last Admin    BCG live (PILO) 50 mg in sodium chloride 0.9% 50 mL bladder instillation  50 mg Intravesical See admin instructions             Review of Systems   Constitutional:  Negative for chills and fever.   Respiratory:  Negative for shortness of breath.    Cardiovascular:  Negative for chest pain.   Gastrointestinal:  Negative for nausea and vomiting.   Genitourinary:  Positive for dysuria. Negative for difficulty urinating and hematuria.   Musculoskeletal:  Negative for back pain.   Skin:  Negative for rash.   Neurological:  Negative for dizziness.   Psychiatric/Behavioral:  Negative for agitation.        Objective:      /65   Pulse 67   Wt 81.8 kg (180 lb 5.4 oz)   BMI 23.79 kg/m²     Physical Exam  Constitutional:       Appearance: Normal appearance.   Pulmonary:      Effort: Pulmonary effort is normal.   Abdominal:      Palpations: Abdomen is soft.   Genitourinary:     Comments: Normal circumcised male phallus with no evidence of balanitis.  Normal orthotopic and patent urethral meatus.  Neurological:      Mental Status: He is alert and oriented to person, place, and time.   Psychiatric:         Mood and Affect: Mood normal.         Office Visit on 10/02/2024   Component Date Value Ref Range Status    POC Blood, Urine 10/02/2024 Positive (A)  Negative Final    POC Bilirubin, Urine 10/02/2024 Negative  Negative Final    POC Urobilinogen, Urine 10/02/2024 0.2 (A)  0.3 - 2.2 Final    POC Ketones, Urine 10/02/2024 Negative  Negative Final    POC Protein, Urine 10/02/2024 Positive (A)  Negative Final    POC Nitrates, Urine 10/02/2024 Negative  Negative Final    POC Glucose, Urine 10/02/2024 Negative  Negative Final    pH, UA 10/02/2024 6.0   Final    POC Specific Gravity, Urine 10/02/2024 1.025  1.003 - 1.029 Final    POC Leukocytes, Urine 10/02/2024 Positive (A)  Negative Final        Results for orders placed or performed in visit on 04/08/24 (from the past 8760 hours)   POCT Bladder Scan   Result Value    POC Residual Urine Volume 146 (A)        Assessment:       1. Malignant neoplasm of overlapping sites of bladder    2. Weight loss    3. Acute cystitis without hematuria    4. Dysuria    5. BPH without urinary obstruction      Plan:     Orders Placed This Encounter    CULTURE, URINE    CT Urogram Abd Pelvis W WO    Comprehensive Metabolic Panel    PROSTATE SPECIFIC ANTIGEN, DIAGNOSTIC    POCT Urinalysis, Dipstick, Automated, W/O Scope          11-22-23  Renal ultrasound.  No hydronephrosis or renal stone is seen bilaterally.  9 mm, 7 mm, and 6 mm adjacent simple appearing left renal cysts.  No right renal lesion is  seen.     I reviewed his above imaging result.            10-13-23  PSA 0.99     4-24-24  Cr 1.1.  GFR >60.     I reviewed his above lab result.            Assessment:  - Bladder cancer in multiple sites in his bladder s/p transurethral resection of several bladder tumors, bilateral retrograde pyelograms, and right ureteral stent placement on 5-3-24.  Path of his midline posterior wall, left posterior wall, two dome tumors, and trigone near the right ureteral orifice all showed high grade papillary urothelial carcinoma, no definitive evidence of invasion, and muscularis propria is present and uninvolved by tumor.  His right ureteral stent was removed cystoscopically on 5-24-24.    - He underwent 6 weeks of induction BCG treatments starting on 12-6-23.  - He underwent a second round of 6 weeks of BCG treatments from 6-6-24 to 8-19-24.  - History of a TURBT and bladder biopsies with fulguration of 5 bladder tumors, normal bilateral RGPs, and right ureteral stent placement on 11-8-23.  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  Other biopsy locations showed high grade papillary urothelial carcinoma non invasive.    - History of bladder biopsies on 8-29-23 by Dr Moe Henning, Urologist in [place].  Path showed high grade urothelial carcinoma with no definitive invasion.  - 20 pound unintentional weight loss this year per the patient.  - Cystitis.  Mild intermittent dysuria at the end of urination.   - Persistent microscopic hematuria.  - BPH without significant LUTS.  - He is a former smoker.  - CAD.  History of an MI.  He takes aspirin 81 mg and plavix.  Dr Ty Suh is his Cardiologist.     Plan:  - PSA and CMP today.  - Urine culture today.  - I discussed dietary modifications with him today and I recommended he drink mostly water during the day.   - CT urogram followed by a  cystoscopy.

## 2024-10-03 ENCOUNTER — TELEPHONE (OUTPATIENT)
Dept: UROLOGY | Facility: CLINIC | Age: 84
End: 2024-10-03
Payer: MEDICARE

## 2024-10-03 NOTE — TELEPHONE ENCOUNTER
Returned call to pt; no answer.  His message indicated that he wanted CT order sent elsewhere; need to know where; asked pt to call back with that info.

## 2024-10-04 ENCOUNTER — TELEPHONE (OUTPATIENT)
Dept: UROLOGY | Facility: CLINIC | Age: 84
End: 2024-10-04
Payer: MEDICARE

## 2024-10-04 LAB — BACTERIA UR CULT: NO GROWTH

## 2024-10-04 NOTE — TELEPHONE ENCOUNTER
Spoke to pt and informed him that Dr. Rachel wanted his CT scan and cysto appt moved up.  Pt states he needed to have the CT scan done at [facility] in [place] as he has to drive 100 miles to get to [place].  I called [facility] and got CT scheduled for 10/9/24 @ 9:30 am.  I attempted to call pt back with appt details but he did not answer.  Detailed msg left on voice mail.

## 2024-10-04 NOTE — TELEPHONE ENCOUNTER
Called pt to give test results; no answer.  Detailed message left on his voice mail informing him that his PSA was normal at 1.4 and his creatinine (which is a measurement of his overall kidney function) is 1.6 was elevated, and urine culture was negative for infection. Instructed pt to increase his water intake and to avoid NSAIDS such has ibuprofen and aleve.  Also informed pt that per Dr. Rachel's request, his CT and cysto appts have been moved up.

## 2024-10-07 ENCOUNTER — TELEPHONE (OUTPATIENT)
Dept: UROLOGY | Facility: CLINIC | Age: 84
End: 2024-10-07
Payer: MEDICARE

## 2024-10-07 NOTE — TELEPHONE ENCOUNTER
Called pt to confirm that he got my message last week about the details of his CT appt; pt stated he did.  Also asked pt to have hospital put results on a disc and to bring it with him for his pending appt with Dr. Rachel.

## 2024-10-08 ENCOUNTER — TELEPHONE (OUTPATIENT)
Dept: UROLOGY | Facility: CLINIC | Age: 84
End: 2024-10-08
Payer: MEDICARE

## 2024-10-08 DIAGNOSIS — C67.0 MALIGNANT NEOPLASM OF TRIGONE OF URINARY BLADDER: Primary | ICD-10-CM

## 2024-10-09 ENCOUNTER — TELEPHONE (OUTPATIENT)
Dept: UROLOGY | Facility: CLINIC | Age: 84
End: 2024-10-09
Payer: MEDICARE

## 2024-10-09 NOTE — TELEPHONE ENCOUNTER
Duplicate message     ----- Message from Angela sent at 10/9/2024 10:45 AM CDT -----  Regarding: urgent  Type:  Needs Medical Advice    Who Called: GRACIELA SQUIRES [[id number]]  Symptoms (please be specific):    How long has patient had these symptoms:    Pharmacy name and phone #:    Would the patient rather a call back or a response via [facility]?   Best Call Back Number:  [phone]  Additional Information: Patient has a CT Urogram Abd Pelvis W WO schedule for tomorrow . Patient waiting on a call to clear if patient can have the Ct  done  without contrast . Patient like a call soon as possible

## 2024-10-09 NOTE — TELEPHONE ENCOUNTER
Returned call to Selena at [facility]; they were not able to do the CT due to abnormal labs.  Selena was busy and I was told that she would call me back.  Dr. Rachel notified.

## 2024-10-10 ENCOUNTER — TELEPHONE (OUTPATIENT)
Dept: UROLOGY | Facility: CLINIC | Age: 84
End: 2024-10-10
Payer: MEDICARE

## 2024-10-10 NOTE — TELEPHONE ENCOUNTER
I spoke to the radiology tech at [facility] in [place]; asked if they could do a low dose IV contrast on pt as creatinine was 1.6; I was told that they do not have a low dose contrast. I then called pt and explained this to him; pt was scheduled to have the CT done at [facility] where he could get a low dose of contrast.

## 2024-10-14 ENCOUNTER — HOSPITAL ENCOUNTER (OUTPATIENT)
Dept: RADIOLOGY | Facility: HOSPITAL | Age: 84
Discharge: HOME | End: 2024-10-14
Attending: UROLOGY
Payer: MEDICARE

## 2024-10-14 DIAGNOSIS — C67.8 MALIGNANT NEOPLASM OF OVERLAPPING SITES OF BLADDER: ICD-10-CM

## 2024-10-14 PROCEDURE — 74178 CT ABD&PLV WO CNTR FLWD CNTR: CPT | Mod: TC

## 2024-10-14 PROCEDURE — 25500020 PHARM REV CODE 255: Performed by: UROLOGY

## 2024-10-14 PROCEDURE — 74178 CT ABD&PLV WO CNTR FLWD CNTR: CPT | Mod: 26,,, | Performed by: RADIOLOGY

## 2024-10-14 RX ADMIN — IOHEXOL 100 ML: 350 INJECTION, SOLUTION INTRAVENOUS at 10:10

## 2024-10-16 ENCOUNTER — PROCEDURE VISIT (OUTPATIENT)
Dept: UROLOGY | Facility: CLINIC | Age: 84
End: 2024-10-16
Payer: MEDICARE

## 2024-10-16 DIAGNOSIS — C67.8 MALIGNANT NEOPLASM OF OVERLAPPING SITES OF BLADDER: Primary | ICD-10-CM

## 2024-10-16 DIAGNOSIS — R35.1 NOCTURIA: ICD-10-CM

## 2024-10-16 DIAGNOSIS — N32.81 OVERACTIVE BLADDER: ICD-10-CM

## 2024-10-16 DIAGNOSIS — R91.8 PULMONARY NODULES: ICD-10-CM

## 2024-10-16 LAB
BILIRUB UR QL STRIP: NEGATIVE
GLUCOSE UR QL STRIP: NEGATIVE
KETONES UR QL STRIP: NEGATIVE
LEUKOCYTE ESTERASE UR QL STRIP: POSITIVE
PH, POC UA: 6
POC BLOOD, URINE: NEGATIVE
POC NITRATES, URINE: NEGATIVE
PROT UR QL STRIP: POSITIVE
SP GR UR STRIP: 1.02 (ref 1–1.03)
UROBILINOGEN UR STRIP-ACNC: 0.2 (ref 0.3–2.2)

## 2024-10-16 PROCEDURE — 99213 OFFICE O/P EST LOW 20 MIN: CPT | Mod: 25,S$GLB,, | Performed by: UROLOGY

## 2024-10-16 PROCEDURE — 52000 CYSTOURETHROSCOPY: CPT | Mod: S$GLB,,, | Performed by: UROLOGY

## 2024-10-16 PROCEDURE — 88112 CYTOPATH CELL ENHANCE TECH: CPT | Performed by: PATHOLOGY

## 2024-10-16 PROCEDURE — 81003 URINALYSIS AUTO W/O SCOPE: CPT | Mod: QW,S$GLB,, | Performed by: UROLOGY

## 2024-10-16 RX ORDER — OXYBUTYNIN CHLORIDE 5 MG/1
5 TABLET, EXTENDED RELEASE ORAL NIGHTLY
Qty: 90 TABLET | Refills: 1 | Status: SHIPPED | OUTPATIENT
Start: 2024-10-16 | End: 2025-10-16

## 2024-10-16 NOTE — PROCEDURES
Cystoscopy    Date/Time: 10/16/2024 2:00 PM    Performed by: Raoul Rachel MD  Authorized by: Raoul Rachel MD    Consent Done?:  Yes (Verbal) and Yes (Written)  Timeout: prior to procedure the correct patient, procedure, and site was verified    Prep: patient was prepped and draped in usual sterile fashion    Local anesthesia used?: Yes    Anesthesia:  Lidocaine jelly  Local anesthetic:  Topical anesthetic  Indications: history bladder cancer    Position:  Supine  Anesthesia:  Lidocaine jelly  Preparation: Patient was prepped and draped in usual sterile fashion    Scope type:  Flexible cystoscope   patient tolerated the procedure well with no immediate complications  Comments:      The flexible cystoscope was advanced through his urethra into his bladder.  The bladder was inspected in a systematic fashion.  His bilateral ureteral orifices are orthotopic and patent with clear efflux of urine.  No papillary bladder tumor or no suspicious urothelial lesion is seen.  No bladder stone and no foreign body is seen.  Moderate bladder trabeculations.  Mildly enlarged lateral lobes of his prostate.  There is no median lobe of his prostate.  A bladder wash urine cytology was obtained.  The cystoscope was removed from his bladder.  The patient tolerated this procedure well.        10-14-24  CT urogram.  See report.  Kidneys/ Ureters: Punctate nonobstructing right renal calculus.  Too small to characterize hypodensities in both kidneys statistically representing cysts.  No hydronephrosis.  No hydroureter.  Both ureters are well opacified; no urothelial lesion in the bilateral renal pelves or ureters.  Bladder: Irregular bladder wall thickening posteriorly, more pronounced around the right ureteral vesicular junction.  There is also some bladder wall thickening along the dome of the bladder and anteriorly.  Reproductive organs: Unremarkable.  There are a few pulmonary nodules bilaterally such as the 6 x 4 mm nodule in the  inferior right upper lobe (series 2, image 14). Additional index pulmonary nodule in the right upper lobe measures 8 mm x 6 mm.  Small gallstones.  Significant lumbar degenerative changes most pronounced at the L2-L3 level where there is at least moderate central canal stenosis.     11-22-23  Renal ultrasound.  No hydronephrosis or renal stone is seen bilaterally.  9 mm, 7 mm, and 6 mm adjacent simple appearing left renal cysts.  No right renal lesion is seen.     I reviewed his above imaging result.           10-2-24  PSA 1.4     10-13-23  PSA 0.99    10-2-24  Cr 1.6.  GFR 42.5.  BUN 20.     4-24-24  Cr 1.1.  GFR >60.     I reviewed his above lab result.       Assessment:  - Surveillance cystoscopy today on 10-16-24 showed no papillary bladder tumor or no suspicious urothelial lesion is seen, moderate bladder trabeculations, and a mildly enlarged prostate.  - CT urogram on 10-14-24 showed:  Kidneys/ Ureters: Punctate nonobstructing right renal calculus.  Too small to characterize hypodensities in both kidneys statistically representing cysts.  No hydronephrosis.  No hydroureter.  Both ureters are well opacified; no urothelial lesion in the bilateral renal pelves or ureters.  Bladder: Irregular bladder wall thickening posteriorly, more pronounced around the right ureteral vesicular junction.  There is also some bladder wall thickening along the dome of the bladder and anteriorly.  Reproductive organs: Unremarkable.  There are a few pulmonary nodules bilaterally such as the 6 x 4 mm nodule in the inferior right upper lobe (series 2, image 14). Additional index pulmonary nodule in the right upper lobe measures 8 mm x 6 mm.    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  Path of his midline posterior wall, left posterior wall, two dome tumors, and trigone near the right ureteral  orifice all showed high grade papillary urothelial carcinoma, no definitive evidence of invasion, and muscularis propria is present and uninvolved by tumor.  His right ureteral stent was removed cystoscopically on 5-24-24.    - He underwent 6 weeks of induction BCG treatments starting on 12-6-23.  - He underwent a second round of 6 weeks of BCG treatments from 6-6-24 to 8-19-24.  - History of a TURBT and bladder biopsies with fulguration of 5 bladder tumors, normal bilateral RGPs, and right ureteral stent placement on 11-8-23.  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  Other biopsy locations showed high grade papillary urothelial carcinoma non invasive.    - History of bladder biopsies on 8-29-23 by Dr Moe Henning, Urologist in [place].  Path showed high grade urothelial carcinoma with no definitive invasion.  - 20 pound unintentional weight loss this year per the patient.  - Nocturia and OAB.  - Persistent microscopic hematuria.  - BPH without significant LUTS.  - He is a former smoker.  - CAD.  History of an MI.  He takes aspirin 81 mg and plavix.  Dr Ty Suh is his Cardiologist.     Plan:  - Bladder wash urine cytology today on 10-16-24.  - I discussed options with him today after his cystoscopy regarding his nocturia, urgency, and daytime frequency and the mutual decision was made to start him on a bladder control medication.  - Started oxybutynin 5 mg ER 1 PO qhs today on 10-16-24.  - I discussed dietary modifications with him today and I recommended he drink mostly water during the day.   - CT chest without IV contrast in 3 months.  - RTC in 3 months for a surveillance cystoscopy.

## 2024-10-19 LAB
FINAL PATHOLOGIC DIAGNOSIS: ABNORMAL
Lab: ABNORMAL

## 2024-10-21 DIAGNOSIS — R82.89 ABNORMAL URINE CYTOLOGY: Primary | ICD-10-CM

## 2024-10-29 ENCOUNTER — TELEPHONE (OUTPATIENT)
Dept: UROLOGY | Facility: CLINIC | Age: 84
End: 2024-10-29
Payer: MEDICARE

## 2024-10-30 ENCOUNTER — TELEPHONE (OUTPATIENT)
Dept: UROLOGY | Facility: CLINIC | Age: 84
End: 2024-10-30
Payer: MEDICARE

## 2024-11-13 ENCOUNTER — OFFICE VISIT (OUTPATIENT)
Dept: UROLOGY | Facility: CLINIC | Age: 84
End: 2024-11-13
Payer: MEDICARE

## 2024-11-13 ENCOUNTER — HOSPITAL ENCOUNTER (OUTPATIENT)
Dept: CARDIOLOGY | Facility: HOSPITAL | Age: 84
Discharge: HOME | End: 2024-11-13
Attending: UROLOGY
Payer: MEDICARE

## 2024-11-13 ENCOUNTER — HOSPITAL ENCOUNTER (OUTPATIENT)
Dept: RADIOLOGY | Facility: HOSPITAL | Age: 84
Discharge: HOME | End: 2024-11-13
Attending: UROLOGY
Payer: MEDICARE

## 2024-11-13 VITALS — WEIGHT: 178.56 LBS | HEIGHT: 73 IN | BODY MASS INDEX: 23.66 KG/M2

## 2024-11-13 DIAGNOSIS — Z01.818 PRE-OP TESTING: ICD-10-CM

## 2024-11-13 DIAGNOSIS — R35.1 NOCTURIA: ICD-10-CM

## 2024-11-13 DIAGNOSIS — R31.29 OTHER MICROSCOPIC HEMATURIA: ICD-10-CM

## 2024-11-13 DIAGNOSIS — C67.8 MALIGNANT NEOPLASM OF OVERLAPPING SITES OF BLADDER: Primary | ICD-10-CM

## 2024-11-13 DIAGNOSIS — N32.81 OVERACTIVE BLADDER: ICD-10-CM

## 2024-11-13 DIAGNOSIS — R82.89 ABNORMAL URINE CYTOLOGY: ICD-10-CM

## 2024-11-13 LAB
BILIRUB UR QL STRIP: NEGATIVE
GLUCOSE UR QL STRIP: NEGATIVE
KETONES UR QL STRIP: NEGATIVE
LEUKOCYTE ESTERASE UR QL STRIP: POSITIVE
OHS QRS DURATION: 136 MS
OHS QTC CALCULATION: 436 MS
PH, POC UA: 5.5
POC BLOOD, URINE: POSITIVE
POC NITRATES, URINE: NEGATIVE
PROT UR QL STRIP: NEGATIVE
SP GR UR STRIP: 1.01 (ref 1–1.03)
UROBILINOGEN UR STRIP-ACNC: 0.2 (ref 0.3–2.2)

## 2024-11-13 PROCEDURE — 93010 ELECTROCARDIOGRAM REPORT: CPT | Mod: ,,, | Performed by: INTERNAL MEDICINE

## 2024-11-13 PROCEDURE — 1159F MED LIST DOCD IN RCRD: CPT | Mod: CPTII,S$GLB,, | Performed by: UROLOGY

## 2024-11-13 PROCEDURE — 93005 ELECTROCARDIOGRAM TRACING: CPT

## 2024-11-13 PROCEDURE — 3288F FALL RISK ASSESSMENT DOCD: CPT | Mod: CPTII,S$GLB,, | Performed by: UROLOGY

## 2024-11-13 PROCEDURE — 71046 X-RAY EXAM CHEST 2 VIEWS: CPT | Mod: 26,,, | Performed by: RADIOLOGY

## 2024-11-13 PROCEDURE — 1101F PT FALLS ASSESS-DOCD LE1/YR: CPT | Mod: CPTII,S$GLB,, | Performed by: UROLOGY

## 2024-11-13 PROCEDURE — 71046 X-RAY EXAM CHEST 2 VIEWS: CPT | Mod: TC

## 2024-11-13 PROCEDURE — 99999 PR PBB SHADOW E&M-EST. PATIENT-LVL IV: CPT | Mod: PBBFAC,,, | Performed by: UROLOGY

## 2024-11-13 PROCEDURE — 81003 URINALYSIS AUTO W/O SCOPE: CPT | Mod: QW,S$GLB,, | Performed by: UROLOGY

## 2024-11-13 PROCEDURE — 1126F AMNT PAIN NOTED NONE PRSNT: CPT | Mod: CPTII,S$GLB,, | Performed by: UROLOGY

## 2024-11-13 PROCEDURE — 99215 OFFICE O/P EST HI 40 MIN: CPT | Mod: S$GLB,,, | Performed by: UROLOGY

## 2024-11-13 RX ORDER — OXYBUTYNIN CHLORIDE 10 MG/1
10 TABLET, EXTENDED RELEASE ORAL NIGHTLY
Qty: 90 TABLET | Refills: 3 | Status: SHIPPED | OUTPATIENT
Start: 2024-11-13 | End: 2025-11-13

## 2024-11-13 NOTE — H&P (VIEW-ONLY)
Subjective:       Patient ID: Alexander Crawford is a 84 y.o. male.    Chief Complaint: Follow-up      History of Present Illness:     Mr Crawford underwent a surveillance cystoscopy on 10-16-24 that showed no papillary bladder tumor or no suspicious urothelial lesion is seen, moderate bladder trabeculations, and a mildly enlarged prostate.  His urine cytology on 10-16-24 showed atypical urothelial cells.  His urine urovysion on 10-16-24 was suspicious.    He had bladder cancer in multiple sites in his bladder s/p transurethral resection of several bladder tumors, bilateral retrograde pyelograms, and right ureteral stent placement on 5-3-24.  Path of his midline posterior wall, left posterior wall, two dome tumors, and trigone near the right ureteral orifice all showed high grade papillary urothelial carcinoma, no definitive evidence of invasion, and muscularis propria is present and uninvolved by tumor.  His right ureteral stent was removed cystoscopically on 5-24-24.      He underwent 6 weeks of induction BCG treatments starting on 12-6-23.  He underwent a second round of 6 weeks of BCG treatments from 6-6-24 to 8-19-24.    He has a history of a TURBT and bladder biopsies with fulguration of 5 bladder tumors, normal bilateral RGPs, and right ureteral stent placement on 11-8-23.  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  Other biopsy locations showed high grade papillary urothelial carcinoma non invasive.      He has a history of bladder biopsies on 8-29-23 by Dr Moe Henning, Urologist in [place].  Path showed high grade urothelial carcinoma with no definitive invasion.    The patient says that he has had a 20 pound unintentional weight loss this year per the patient.  Nocturia every 1 to 2 hours.  He has urinary urgency and daytime urinary frequency.   He was started on oxybutynin 5 mg ER 1 PO qhs  on 10-16-24 without improvement in his symptoms.  No significant dysuria.  No gross hematuria.  Normal urinary flow.         Past Medical History:   Diagnosis Date    Bladder cancer     CAD (coronary artery disease)     CVA (cerebral vascular accident)     Diabetes mellitus     HLD (hyperlipidemia)     Hypertension     Myocardial infarction      Family History   Problem Relation Name Age of Onset    Lung cancer Father       Social History     Socioeconomic History    Marital status:    Tobacco Use    Smoking status: Former     Types: Cigarettes    Smokeless tobacco: Never    Tobacco comments:     Quit 30 yrs ago.    Substance and Sexual Activity    Alcohol use: Not Currently     Comment: Ocassionally drinks beer.    Drug use: Never     Social Drivers of Health     Financial Resource Strain: Low Risk  (10/31/2023)    Received from [facility] and Its Subsidiaries and Affiliates, [facility] and Its Subsidiaries and Affiliates    Overall Financial Resource Strain (CARDIA)     Difficulty of Paying Living Expenses: Not very hard   Food Insecurity: No Food Insecurity (10/31/2023)    Received from [facility] and Its Subsidiaries and Affiliates, [facility] and Its Subsidiaries and Affiliates    Hunger Vital Sign     Worried About Running Out of Food in the Last Year: Never true     Ran Out of Food in the Last Year: Never true   Transportation Needs: No Transportation Needs (10/31/2023)    Received from [facility] and Its Subsidiaries and Affiliates, [facility] and Its Subsidiaries and Affiliates    PRAPARE - Transportation     Lack of Transportation (Medical): No     Lack of Transportation (Non-Medical): No   Physical Activity: Insufficiently Active (10/31/2023)    Received from  [facility] and Its Subsidiaries and Affiliates, [facility] [place] [facility]    Exercise Vital Sign     Days of Exercise per Week: 3 days     Minutes of Exercise per Session: 30 min   Housing Stability: Unknown (10/31/2023)    Received from [facility] and Its Subsidiaries and Affiliates, [facility] [place] [facility]    Housing Stability Vital Sign     Number of Places Lived in the Last Year: 1     Outpatient Encounter Medications as of 11/13/2024   Medication Sig Dispense Refill    aspirin (ECOTRIN) 81 MG EC tablet Take 1 tablet by mouth every morning.      clopidogreL (PLAVIX) 75 mg tablet Take 1 tablet by mouth every morning.      gabapentin (NEURONTIN) 300 MG capsule Take 300 mg by mouth Daily.      glipiZIDE (GLUCOTROL) 5 MG tablet Take 5 mg by mouth 2 (two) times daily.      hyoscyamine (LEVSIN) 0.125 mg Subl Place 1 tablet (0.125 mg total) under the tongue every 6 (six) hours as needed (Take 1 under the tongue every 6 hours as needed for bladder spasms). 30 tablet 1    isosorbide mononitrate (IMDUR) 120 MG 24 hr tablet Take 120 mg by mouth once daily.      losartan-hydrochlorothiazide 50-12.5 mg (HYZAAR) 50-12.5 mg per tablet Take 1 tablet by mouth once daily.      metFORMIN (GLUCOPHAGE) 1000 MG tablet Take 1,000 mg by mouth 2 (two) times daily.      oxybutynin (DITROPAN-XL) 5 MG TR24 Take 1 tablet (5 mg total) by mouth nightly. 90 tablet 1    simvastatin (ZOCOR) 20 MG tablet Take 20 mg by mouth.      sulfamethoxazole-trimethoprim 800-160mg (BACTRIM DS) 800-160 mg Tab Take 1 tablet by mouth 2 (two) times daily. 20 tablet 0    oxybutynin (DITROPAN-XL) 10 MG 24 hr tablet Take 1 tablet (10 mg total) by mouth nightly. 90 tablet 3     Facility-Administered Encounter Medications as of 11/13/2024   Medication Dose Route  "Frequency Provider Last Rate Last Admin    BCG live (PILO) 50 mg in sodium chloride 0.9% 50 mL bladder instillation  50 mg Intravesical See admin instructions             Review of Systems   Constitutional:  Negative for chills and fever.   Respiratory:  Negative for shortness of breath.    Cardiovascular:  Negative for chest pain.   Gastrointestinal:  Negative for nausea and vomiting.   Genitourinary:  Positive for frequency and urgency. Negative for difficulty urinating, flank pain and hematuria.   Musculoskeletal:  Negative for back pain.   Skin:  Negative for rash.   Neurological:  Negative for dizziness.   Psychiatric/Behavioral:  Negative for agitation.        Objective:     Ht 6' 1" (1.854 m)   Wt 81 kg (178 lb 9.2 oz)   BMI 23.56 kg/m²     Physical Exam  Constitutional:       Appearance: Normal appearance.   Cardiovascular:      Rate and Rhythm: Normal rate.   Pulmonary:      Effort: Pulmonary effort is normal.   Abdominal:      Palpations: Abdomen is soft.   Neurological:      Mental Status: He is alert and oriented to person, place, and time.   Psychiatric:         Mood and Affect: Mood normal.         Hospital Outpatient Visit on 11/13/2024   Component Date Value Ref Range Status    QRS Duration 11/13/2024 136  ms Final    [facility] QTC Calculation 11/13/2024 436  ms Final   Office Visit on 11/13/2024   Component Date Value Ref Range Status    POC Blood, Urine 11/13/2024 Positive (A)  Negative Final    POC Bilirubin, Urine 11/13/2024 Negative  Negative Final    POC Urobilinogen, Urine 11/13/2024 0.2 (A)  0.3 - 2.2 Final    POC Ketones, Urine 11/13/2024 Negative  Negative Final    POC Protein, Urine 11/13/2024 Negative  Negative Final    POC Nitrates, Urine 11/13/2024 Negative  Negative Final    POC Glucose, Urine 11/13/2024 Negative  Negative Final    pH, UA 11/13/2024 5.5   Final    POC Specific Gravity, Urine 11/13/2024 1.010  1.003 - 1.029 Final    POC Leukocytes, Urine 11/13/2024 Positive (A)  Negative " Final        Results for orders placed or performed in visit on 04/08/24 (from the past 8760 hours)   POCT Bladder Scan   Result Value    POC Residual Urine Volume 146 (A)        Assessment:       1. Malignant neoplasm of overlapping sites of bladder    2. Pre-op testing    3. Abnormal urine cytology    4. Nocturia    5. Overactive bladder    6. Other microscopic hematuria      Plan:     Orders Placed This Encounter    X-Ray Chest PA And Lateral    CBC Auto Differential    Comprehensive Metabolic Panel    Diet NPO    POCT Urinalysis, Dipstick, Automated, W/O Scope    EKG 12-lead    oxybutynin (DITROPAN-XL) 10 MG 24 hr tablet    Case Request Operating Room: CYSTOSCOPY, WITH BLADDER BIOPSY, WITH FULGURATION IF INDICATED          10-14-24  CT urogram.  See report.  Kidneys/ Ureters: Punctate nonobstructing right renal calculus.  Too small to characterize hypodensities in both kidneys statistically representing cysts.  No hydronephrosis.  No hydroureter.  Both ureters are well opacified; no urothelial lesion in the bilateral renal pelves or ureters.  Bladder: Irregular bladder wall thickening posteriorly, more pronounced around the right ureteral vesicular junction.  There is also some bladder wall thickening along the dome of the bladder and anteriorly.  Reproductive organs: Unremarkable.  There are a few pulmonary nodules bilaterally such as the 6 x 4 mm nodule in the inferior right upper lobe (series 2, image 14). Additional index pulmonary nodule in the right upper lobe measures 8 mm x 6 mm.  Small gallstones.  Significant lumbar degenerative changes most pronounced at the L2-L3 level where there is at least moderate central canal stenosis.      11-22-23  Renal ultrasound.  No hydronephrosis or renal stone is seen bilaterally.  9 mm, 7 mm, and 6 mm adjacent simple appearing left renal cysts.  No right renal lesion is seen.     I reviewed his above imaging result.            10-2-24  PSA 1.4     10-13-23  PSA 0.99      10-2-24  Cr 1.6.  GFR 42.5.  BUN 20.     4-24-24  Cr 1.1.  GFR >60.    10-16-24  Urine cytology.  Atypical urothelial cells.    10-16-24  Urine urovysion.  Suspicious.     I reviewed his above lab result.           Assessment:  - Surveillance cystoscopy on 10-16-24 showed no papillary bladder tumor or no suspicious urothelial lesion is seen, moderate bladder trabeculations, and a mildly enlarged prostate.  - Urine cytology on 10-16-24 showed atypical urothelial cells.  Urine urovysion on 10-16-24 was suspicious.  - CT urogram on 10-14-24 showed:  Kidneys/ Ureters: Punctate nonobstructing right renal calculus.  Too small to characterize hypodensities in both kidneys statistically representing cysts.  No hydronephrosis.  No hydroureter.  Both ureters are well opacified; no urothelial lesion in the bilateral renal pelves or ureters.  Bladder: Irregular bladder wall thickening posteriorly, more pronounced around the right ureteral vesicular junction.  There is also some bladder wall thickening along the dome of the bladder and anteriorly.  Reproductive organs: Unremarkable.  There are a few pulmonary nodules bilaterally such as the 6 x 4 mm nodule in the inferior right upper lobe (series 2, image 14). Additional index pulmonary nodule in the right upper lobe measures 8 mm x 6 mm.    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  Path of his midline posterior wall, left posterior wall, two dome tumors, and trigone near the right ureteral orifice all showed high grade papillary urothelial carcinoma, no definitive evidence of invasion, and muscularis propria is present and uninvolved by tumor.  His right ureteral stent was removed cystoscopically on 5-24-24.    - He underwent 6 weeks of induction BCG treatments starting on 12-6-23.  - He underwent a second round of 6 weeks of BCG  treatments from 6-6-24 to 8-19-24.  - History of a TURBT and bladder biopsies with fulguration of 5 bladder tumors, normal bilateral RGPs, and right ureteral stent placement on 11-8-23.  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  Other biopsy locations showed high grade papillary urothelial carcinoma non invasive.    - History of bladder biopsies on 8-29-23 by Dr Moe Henning, Urologist in [place].  Path showed high grade urothelial carcinoma with no definitive invasion.  - 20 pound unintentional weight loss this year per the patient.  - Persistent microscopic hematuria.  Trace amount of MSH today on 11-13-24.  - Nocturia and OAB.  He was started on oxybutynin 5 mg ER 1 PO qhs on 10-16-24 without improvement in his symptoms.  - BPH without obstructive LUTS.  - He is a former smoker.  - CAD.  History of an MI.  He takes aspirin 81 mg and plavix.  Dr Ty Suh is his Cardiologist.     Plan:  - I had a long discussion with the patient today regarding his abnormal urine cytology and his abnormal urine urovysion.  I discussed options with him today and the mutual decision was made to proceed to the OR for a cystoscopy with bladder biopsies with fulguration, possible transurethral resections of bladder tumors, bilateral retrograde pyelograms, possible bilateral ureteral stents placement, possible bilateral diagnostic ureteroscopies with interventions such as biopsies, and any other indicated procedures.  Risks and benefits of the surgery were explained to the patient and the patient expressed understanding.  All questions were answered by me to the patient's apparent understanding and to the patient's apparent satisfaction.  Surgery consent was signed today by the patient.   - Referral to his Cardiologist, Dr Ty Suh, for preop cardiac clearance and for clearance to stop his blood thinners  prior to his surgery.  - Increased oxybutynin 5 mg ER 1 PO qhs to 10 mg ER 1 PO qhs today on 11-13-24.    - I discussed dietary modifications with him today and I recommended he drink mostly water during the day.  - I recommended he limit his fluid intake at night to small amounts of water and to void just prior to bedtime.   - He has a CT chest without IV contrast is scheduled for 1-22-25.

## 2024-11-13 NOTE — PROGRESS NOTES
Subjective:       Patient ID: Alexander Crawford is a 84 y.o. male.    Chief Complaint: Follow-up      History of Present Illness:     Mr Crawford underwent a surveillance cystoscopy on 10-16-24 that showed no papillary bladder tumor or no suspicious urothelial lesion is seen, moderate bladder trabeculations, and a mildly enlarged prostate.  His urine cytology on 10-16-24 showed atypical urothelial cells.  His urine urovysion on 10-16-24 was suspicious.    He had bladder cancer in multiple sites in his bladder s/p transurethral resection of several bladder tumors, bilateral retrograde pyelograms, and right ureteral stent placement on 5-3-24.  Path of his midline posterior wall, left posterior wall, two dome tumors, and trigone near the right ureteral orifice all showed high grade papillary urothelial carcinoma, no definitive evidence of invasion, and muscularis propria is present and uninvolved by tumor.  His right ureteral stent was removed cystoscopically on 5-24-24.      He underwent 6 weeks of induction BCG treatments starting on 12-6-23.  He underwent a second round of 6 weeks of BCG treatments from 6-6-24 to 8-19-24.    He has a history of a TURBT and bladder biopsies with fulguration of 5 bladder tumors, normal bilateral RGPs, and right ureteral stent placement on 11-8-23.  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  Other biopsy locations showed high grade papillary urothelial carcinoma non invasive.      He has a history of bladder biopsies on 8-29-23 by Dr Moe Henning, Urologist in [place].  Path showed high grade urothelial carcinoma with no definitive invasion.    The patient says that he has had a 20 pound unintentional weight loss this year per the patient.  Nocturia every 1 to 2 hours.  He has urinary urgency and daytime urinary frequency.   He was started on oxybutynin 5 mg ER 1 PO qhs  on 10-16-24 without improvement in his symptoms.  No significant dysuria.  No gross hematuria.  Normal urinary flow.         Past Medical History:   Diagnosis Date    Bladder cancer     CAD (coronary artery disease)     CVA (cerebral vascular accident)     Diabetes mellitus     HLD (hyperlipidemia)     Hypertension     Myocardial infarction      Family History   Problem Relation Name Age of Onset    Lung cancer Father       Social History     Socioeconomic History    Marital status:    Tobacco Use    Smoking status: Former     Types: Cigarettes    Smokeless tobacco: Never    Tobacco comments:     Quit 30 yrs ago.    Substance and Sexual Activity    Alcohol use: Not Currently     Comment: Ocassionally drinks beer.    Drug use: Never     Social Drivers of Health     Financial Resource Strain: Low Risk  (10/31/2023)    Received from [facility] [facility]sidi[facility] and Affiliates, [employer] [facility] and Its Subsidiaries and Affiliates    Overall Financial Resource Strain (CARDIA)     Difficulty of Paying Living Expenses: Not very hard   Food Insecurity: No Food Insecurity (10/31/2023)    Received from [employer] [facility] and Its Subsidiaries and Affiliates, [employer] [facility] and Its Subsidiaries and Affiliates    Hunger Vital Sign     Worried About Running Out of Food in the Last Year: Never true     Ran Out of Food in the Last Year: Never true   Transportation Needs: No Transportation Needs (10/31/2023)    Received from [employer] [facility] Its Subsidiaries and Affiliates, [employer] [facility] and Its Subsidiaries and Affiliates    PRAPARE - Transportation     Lack of Transportation (Medical): No     Lack of Transportation (Non-Medical): No   Physical Activity: Insufficiently Active (10/31/2023)    Received from  [facility] and Its Subsidiaries and Affiliates, [facility]    Exercise Vital Sign     Days of Exercise per Week: 3 days     Minutes of Exercise per Session: 30 min   Housing Stability: Unknown (10/31/2023)    Received from [facility] and Its Subsidiaries and Affiliates, [facility]    Housing Stability Vital Sign     Number of Places Lived in the Last Year: 1     Outpatient Encounter Medications as of 11/13/2024   Medication Sig Dispense Refill    aspirin (ECOTRIN) 81 MG EC tablet Take 1 tablet by mouth every morning.      clopidogreL (PLAVIX) 75 mg tablet Take 1 tablet by mouth every morning.      gabapentin (NEURONTIN) 300 MG capsule Take 300 mg by mouth Daily.      glipiZIDE (GLUCOTROL) 5 MG tablet Take 5 mg by mouth 2 (two) times daily.      hyoscyamine (LEVSIN) 0.125 mg Subl Place 1 tablet (0.125 mg total) under the tongue every 6 (six) hours as needed (Take 1 under the tongue every 6 hours as needed for bladder spasms). 30 tablet 1    isosorbide mononitrate (IMDUR) 120 MG 24 hr tablet Take 120 mg by mouth once daily.      losartan-hydrochlorothiazide 50-12.5 mg (HYZAAR) 50-12.5 mg per tablet Take 1 tablet by mouth once daily.      metFORMIN (GLUCOPHAGE) 1000 MG tablet Take 1,000 mg by mouth 2 (two) times daily.      oxybutynin (DITROPAN-XL) 5 MG TR24 Take 1 tablet (5 mg total) by mouth nightly. 90 tablet 1    simvastatin (ZOCOR) 20 MG tablet Take 20 mg by mouth.      sulfamethoxazole-trimethoprim 800-160mg (BACTRIM DS) 800-160 mg Tab Take 1 tablet by mouth 2 (two) times daily. 20 tablet 0    oxybutynin (DITROPAN-XL) 10 MG 24 hr tablet Take 1 tablet (10 mg total) by mouth nightly. 90 tablet 3     Facility-Administered Encounter Medications as of 11/13/2024   Medication Dose Route  "Frequency Provider Last Rate Last Admin    BCG live (PILO) 50 mg in sodium chloride 0.9% 50 mL bladder instillation  50 mg Intravesical See admin instructions             Review of Systems   Constitutional:  Negative for chills and fever.   Respiratory:  Negative for shortness of breath.    Cardiovascular:  Negative for chest pain.   Gastrointestinal:  Negative for nausea and vomiting.   Genitourinary:  Positive for frequency and urgency. Negative for difficulty urinating, flank pain and hematuria.   Musculoskeletal:  Negative for back pain.   Skin:  Negative for rash.   Neurological:  Negative for dizziness.   Psychiatric/Behavioral:  Negative for agitation.        Objective:     Ht 6' 1" (1.854 m)   Wt 81 kg (178 lb 9.2 oz)   BMI 23.56 kg/m²     Physical Exam  Constitutional:       Appearance: Normal appearance.   Cardiovascular:      Rate and Rhythm: Normal rate.   Pulmonary:      Effort: Pulmonary effort is normal.   Abdominal:      Palpations: Abdomen is soft.   Neurological:      Mental Status: He is alert and oriented to person, place, and time.   Psychiatric:         Mood and Affect: Mood normal.         Hospital Outpatient Visit on 11/13/2024   Component Date Value Ref Range Status    QRS Duration 11/13/2024 136  ms Final    [facility] QTC Calculation 11/13/2024 436  ms Final   Office Visit on 11/13/2024   Component Date Value Ref Range Status    POC Blood, Urine 11/13/2024 Positive (A)  Negative Final    POC Bilirubin, Urine 11/13/2024 Negative  Negative Final    POC Urobilinogen, Urine 11/13/2024 0.2 (A)  0.3 - 2.2 Final    POC Ketones, Urine 11/13/2024 Negative  Negative Final    POC Protein, Urine 11/13/2024 Negative  Negative Final    POC Nitrates, Urine 11/13/2024 Negative  Negative Final    POC Glucose, Urine 11/13/2024 Negative  Negative Final    pH, UA 11/13/2024 5.5   Final    POC Specific Gravity, Urine 11/13/2024 1.010  1.003 - 1.029 Final    POC Leukocytes, Urine 11/13/2024 Positive (A)  Negative " Final        Results for orders placed or performed in visit on 04/08/24 (from the past 8760 hours)   POCT Bladder Scan   Result Value    POC Residual Urine Volume 146 (A)        Assessment:       1. Malignant neoplasm of overlapping sites of bladder    2. Pre-op testing    3. Abnormal urine cytology    4. Nocturia    5. Overactive bladder    6. Other microscopic hematuria      Plan:     Orders Placed This Encounter    X-Ray Chest PA And Lateral    CBC Auto Differential    Comprehensive Metabolic Panel    Diet NPO    POCT Urinalysis, Dipstick, Automated, W/O Scope    EKG 12-lead    oxybutynin (DITROPAN-XL) 10 MG 24 hr tablet    Case Request Operating Room: CYSTOSCOPY, WITH BLADDER BIOPSY, WITH FULGURATION IF INDICATED          10-14-24  CT urogram.  See report.  Kidneys/ Ureters: Punctate nonobstructing right renal calculus.  Too small to characterize hypodensities in both kidneys statistically representing cysts.  No hydronephrosis.  No hydroureter.  Both ureters are well opacified; no urothelial lesion in the bilateral renal pelves or ureters.  Bladder: Irregular bladder wall thickening posteriorly, more pronounced around the right ureteral vesicular junction.  There is also some bladder wall thickening along the dome of the bladder and anteriorly.  Reproductive organs: Unremarkable.  There are a few pulmonary nodules bilaterally such as the 6 x 4 mm nodule in the inferior right upper lobe (series 2, image 14). Additional index pulmonary nodule in the right upper lobe measures 8 mm x 6 mm.  Small gallstones.  Significant lumbar degenerative changes most pronounced at the L2-L3 level where there is at least moderate central canal stenosis.      11-22-23  Renal ultrasound.  No hydronephrosis or renal stone is seen bilaterally.  9 mm, 7 mm, and 6 mm adjacent simple appearing left renal cysts.  No right renal lesion is seen.     I reviewed his above imaging result.            10-2-24  PSA 1.4     10-13-23  PSA 0.99      10-2-24  Cr 1.6.  GFR 42.5.  BUN 20.     4-24-24  Cr 1.1.  GFR >60.    10-16-24  Urine cytology.  Atypical urothelial cells.    10-16-24  Urine urovysion.  Suspicious.     I reviewed his above lab result.           Assessment:  - Surveillance cystoscopy on 10-16-24 showed no papillary bladder tumor or no suspicious urothelial lesion is seen, moderate bladder trabeculations, and a mildly enlarged prostate.  - Urine cytology on 10-16-24 showed atypical urothelial cells.  Urine urovysion on 10-16-24 was suspicious.  - CT urogram on 10-14-24 showed:  Kidneys/ Ureters: Punctate nonobstructing right renal calculus.  Too small to characterize hypodensities in both kidneys statistically representing cysts.  No hydronephrosis.  No hydroureter.  Both ureters are well opacified; no urothelial lesion in the bilateral renal pelves or ureters.  Bladder: Irregular bladder wall thickening posteriorly, more pronounced around the right ureteral vesicular junction.  There is also some bladder wall thickening along the dome of the bladder and anteriorly.  Reproductive organs: Unremarkable.  There are a few pulmonary nodules bilaterally such as the 6 x 4 mm nodule in the inferior right upper lobe (series 2, image 14). Additional index pulmonary nodule in the right upper lobe measures 8 mm x 6 mm.    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  Path of his midline posterior wall, left posterior wall, two dome tumors, and trigone near the right ureteral orifice all showed high grade papillary urothelial carcinoma, no definitive evidence of invasion, and muscularis propria is present and uninvolved by tumor.  His right ureteral stent was removed cystoscopically on 5-24-24.    - He underwent 6 weeks of induction BCG treatments starting on 12-6-23.  - He underwent a second round of 6 weeks of BCG  treatments from 6-6-24 to 8-19-24.  - History of a TURBT and bladder biopsies with fulguration of 5 bladder tumors, normal bilateral RGPs, and right ureteral stent placement on 11-8-23.  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  Other biopsy locations showed high grade papillary urothelial carcinoma non invasive.    - History of bladder biopsies on 8-29-23 by Dr Moe Henning, Urologist in [place].  Path showed high grade urothelial carcinoma with no definitive invasion.  - 20 pound unintentional weight loss this year per the patient.  - Persistent microscopic hematuria.  Trace amount of MSH today on 11-13-24.  - Nocturia and OAB.  He was started on oxybutynin 5 mg ER 1 PO qhs on 10-16-24 without improvement in his symptoms.  - BPH without obstructive LUTS.  - He is a former smoker.  - CAD.  History of an MI.  He takes aspirin 81 mg and plavix.  Dr Ty Suh is his Cardiologist.     Plan:  - I had a long discussion with the patient today regarding his abnormal urine cytology and his abnormal urine urovysion.  I discussed options with him today and the mutual decision was made to proceed to the OR for a cystoscopy with bladder biopsies with fulguration, possible transurethral resections of bladder tumors, bilateral retrograde pyelograms, possible bilateral ureteral stents placement, possible bilateral diagnostic ureteroscopies with interventions such as biopsies, and any other indicated procedures.  Risks and benefits of the surgery were explained to the patient and the patient expressed understanding.  All questions were answered by me to the patient's apparent understanding and to the patient's apparent satisfaction.  Surgery consent was signed today by the patient.   - Referral to his Cardiologist, Dr Ty Suh, for preop cardiac clearance and for clearance to stop his blood thinners  prior to his surgery.  - Increased oxybutynin 5 mg ER 1 PO qhs to 10 mg ER 1 PO qhs today on 11-13-24.    - I discussed dietary modifications with him today and I recommended he drink mostly water during the day.  - I recommended he limit his fluid intake at night to small amounts of water and to void just prior to bedtime.   - He has a CT chest without IV contrast is scheduled for 1-22-25.

## 2024-11-15 ENCOUNTER — TELEPHONE (OUTPATIENT)
Dept: UROLOGY | Facility: CLINIC | Age: 84
End: 2024-11-15
Payer: MEDICARE

## 2024-11-15 NOTE — TELEPHONE ENCOUNTER
I called and spoke to pt and provided information as outlined in 's message below. Told pt that he needed cardiac clearance to stop his anticoags; pt told me his cardiologist was Dr. Ty Suh.  I faxed info to Dr. Shu requesting clearance; awaiting fax.  Analia Rose LPN    ----- Message from Raoul Rachel MD sent at 11/15/2024 12:45 PM CST -----  Please call Mr Crawford and let him know that I reviewed his preop labs and his labs are acceptable from a surgery standpoint.  Make sure he gets cleared by his Cardiologist and that he is cleared to stop his blood thinners prior to his surgery (I sent a previous message about his cardiac clearance).  He will need to stop aspirin and clopidogrel prior to his surgery.  Make sure he knows his surgery is scheduled for Tuesday 12-3-24 at 8:30 am at the [facility].  The hospital will let you know if your surgery time changes and patient's usually have to show up about 1.5 hours prior to surgery.  Do not eat or drink anything after midnight.  Ask him if he has any questions.

## 2024-11-18 ENCOUNTER — TELEPHONE (OUTPATIENT)
Dept: UROLOGY | Facility: CLINIC | Age: 84
End: 2024-11-18
Payer: MEDICARE

## 2024-11-18 DIAGNOSIS — Z01.818 PRE-OP EXAM: Primary | ICD-10-CM

## 2024-11-18 NOTE — TELEPHONE ENCOUNTER
I called Dr. Suh's office to confirm receipt of request for surgery clearance that I faxed on 11/15/24; I was told that they did not receive it even though I received confirmation.  I faxed it again, and received confirmation; Awaiting response from Dr. Suh.

## 2024-11-25 ENCOUNTER — TELEPHONE (OUTPATIENT)
Dept: UROLOGY | Facility: CLINIC | Age: 84
End: 2024-11-25
Payer: MEDICARE

## 2024-11-25 NOTE — TELEPHONE ENCOUNTER
Received correspondence from pts cardiologist, Dr. Suh; stated pt is at low risk for the planned urology surgery.  Delaying the surgery is not likely to reduce the risk and he can proceed as clinically indicated.  Pt should stop ASA and Plavix 5 days prior to surgery and resume it postoperatively once stable.  Pt and MD notified.  Clearance in media.

## 2024-11-29 ENCOUNTER — OFFICE VISIT (OUTPATIENT)
Dept: INTERNAL MEDICINE | Facility: CLINIC | Age: 84
End: 2024-11-29
Payer: MEDICARE

## 2024-11-29 ENCOUNTER — LAB VISIT (OUTPATIENT)
Dept: LAB | Facility: HOSPITAL | Age: 84
End: 2024-11-29
Attending: NURSE PRACTITIONER
Payer: MEDICARE

## 2024-11-29 VITALS
DIASTOLIC BLOOD PRESSURE: 68 MMHG | TEMPERATURE: 98 F | HEART RATE: 65 BPM | OXYGEN SATURATION: 95 % | SYSTOLIC BLOOD PRESSURE: 158 MMHG

## 2024-11-29 DIAGNOSIS — E78.5 HYPERLIPIDEMIA, UNSPECIFIED HYPERLIPIDEMIA TYPE: ICD-10-CM

## 2024-11-29 DIAGNOSIS — I63.9 CEREBROVASCULAR ACCIDENT (CVA), UNSPECIFIED MECHANISM: ICD-10-CM

## 2024-11-29 DIAGNOSIS — Z01.818 PRE-OP EXAM: ICD-10-CM

## 2024-11-29 DIAGNOSIS — C67.8 MALIGNANT NEOPLASM OF OVERLAPPING SITES OF BLADDER: Primary | ICD-10-CM

## 2024-11-29 DIAGNOSIS — I10 HYPERTENSION, UNSPECIFIED TYPE: ICD-10-CM

## 2024-11-29 DIAGNOSIS — I25.10 CORONARY ARTERY DISEASE, UNSPECIFIED VESSEL OR LESION TYPE, UNSPECIFIED WHETHER ANGINA PRESENT, UNSPECIFIED WHETHER NATIVE OR TRANSPLANTED HEART: ICD-10-CM

## 2024-11-29 DIAGNOSIS — E11.00 TYPE 2 DIABETES MELLITUS WITH HYPEROSMOLARITY WITHOUT COMA, WITHOUT LONG-TERM CURRENT USE OF INSULIN: ICD-10-CM

## 2024-11-29 DIAGNOSIS — D64.9 ANEMIA, UNSPECIFIED TYPE: ICD-10-CM

## 2024-11-29 PROBLEM — E11.9 DIABETES MELLITUS, TYPE 2: Status: ACTIVE | Noted: 2024-11-29

## 2024-11-29 LAB
ESTIMATED AVG GLUCOSE: 146 MG/DL (ref 68–131)
HBA1C MFR BLD: 6.7 % (ref 4–5.6)

## 2024-11-29 PROCEDURE — 99999 PR PBB SHADOW E&M-EST. PATIENT-LVL III: CPT | Mod: PBBFAC,,,

## 2024-11-29 PROCEDURE — 83036 HEMOGLOBIN GLYCOSYLATED A1C: CPT | Performed by: NURSE PRACTITIONER

## 2024-11-29 PROCEDURE — 36415 COLL VENOUS BLD VENIPUNCTURE: CPT | Performed by: NURSE PRACTITIONER

## 2024-11-29 NOTE — ASSESSMENT & PLAN NOTE
-hx of CVA affecting speech in 1995- pt reports resolution of speech deficits and denies any residual   -ASA/Plavix to held per cardiology recommendation   -Simvastatin continued nightly   -patient not followed outpatient by Neurology at this time

## 2024-11-29 NOTE — ASSESSMENT & PLAN NOTE
-hemoglobin A1c pending  -glipizide continued- hold evening prior to and morning of procedure   -Metformin continued- hold 24 hours prior to procedure   -followed outpatient by PCP    Addended by: ALYSSIA BONDS on: 11/20/2019 01:07 PM     Modules accepted: Orders

## 2024-11-29 NOTE — ASSESSMENT & PLAN NOTE
CAD status post PTCA with stents to the RCA and with high-grade stenosis of the diagonal branches (to be managed medically) in 5/2020.   [facility] showed Cardiac catheterization showing patent left main, 30 to 40% stenosis of the proximal LAD, 95% stenosis of the first small diagonal branch, 50 to 70% stenosis of the small second diagonal branch, 30 to 40% stenosis of the circumflex, 50 to 70% stenosis of the proximal RCA and 95 to 99% stenosis of the PDA by study dated 5/2020.   -ASA/Plavix to be held per Cardiology recommendations   -Simvastatin continued nightly   -Isosorbide mononitrate continued- take morning of surgery   -followed outpatient by Cardiology- cardiac clearance with recommendations obtained per office of primary surgeon

## 2024-11-29 NOTE — PRE-PROCEDURE INSTRUCTIONS
To confirm, your doctor has instructed you: Surgery is scheduled for 12/03/24.   Pre admit office will call the afternoon prior to surgery between 1PM and 3PM with arrival time.    Surgery will be at [facility],  The address is [address]. [place] [address].      IMPORTANT INSTRUCTIONS!    Do not eat or drink after 12 midnight, including water. Do not smoke or use chewing tobacco after 12 midnight!  OK to brush teeth, but no gum, candy, or mints!      *Take only these medicines with a small swallow of water-morning of surgery*     ISOSORBIDE MONONITRATE      >>>FOLLOW YOUR SURGEON/CARDIOLOGIST INSTRUCTIONS REGARDING ASPIRIN/PLAVIX<<<       ____ Stop taking all vitamins, herbal supplements, Aspirin, & NSAIDS (Ibuprofen, Advil, Aleve) 7 days prior to surgery, as these can thin your blood.    ____ Weight loss medication, such as Adipex and Phentermine, must be stopped 14 days prior to surgery, no exceptions!    *Diabetic Patients: If you take diabetic or weight loss medication, do NOT take morning of surgery unless instructed by Doctor. Metformin to be stopped 24 hrs prior to surgery. DO NOT take short-acting insulin the day of surgery. Only take HALF of your regular dose of long-acting insulin the night before surgery, unless instructed otherwise. Blood sugars will be checked in pre-op.   ~Ozempic/Mounjaro/Wegovy/Trulicity/Semaglutide injections must be stopped 7 days prior to surgery.     Please notify MD office if you develop an active infection, are prescribed antibiotics by someone other than the surgeon doing your surgery, or visit urgent care/ER.      Bathing Instructions:   The night before surgery and the morning prior to coming to the hospital:    - Shower & rinse your body as usual with anti-bacterial Soap (Dial or Lever 2000)   -Hibiclens (if indicated) use AFTER anti-bacterial soap; 1 packet PM/1 packet in AM on surgical site only   -Do not use hibiclens on your head, face, or  genitals.    -Do not wash with anti-bacterial soap after you use the hibiclens.    -Do not shave surgical site 5-7 days prior to surgery.    -Pubic hair 7 days prior to surgery (OBGYN/Urology only).       Additional Instructions:   __ No makeup, powder, lotions, creams, or body spray to skin   __ No deodorant if you are having: breast procedure, PORT, or upper shoulder surgery!   __ No nail polish or artificial nails       **SURGERY WILL BE CANCELLED IF ARTIFICIAL/NAIL POLISH IS PRESENT!!!**  __ Please remove all piercings and leave all jewelry at home.    **SURGERY WILL BE CANCELLED IF PIERCINGS ARE PRESENT!!!**    __ Dentures, Hearing Aids and Contact Lens need to be removed prior to the start of surgery.    __ Avoid Alcoholic beverages 3 days prior to surgery, as it can thin the blood, unless told otherwise by pre-admit department.  __ Females: may need to give a urine sample the morning of surgery;   **Please ask  for a specimen cup if you need to use the restroom prior to being called into pre-op.**  __ Males: Stop ED medications (Viagra, Cialis) 24 hrs prior to surgery.  __ Wear clean, loose-fitting clothing. Allow for dressings/bandages/surgical equipment   __ You must have transportation, and they MUST stay the entire time.   __  Bring photo ID and insurance information to [facility] Vis[facility]/Ride Policy:   Only 1 adult allowed (over the age of 18) to accompany you and MUST STAY through the entire length of admission.     -Must have a ride home from a responsible adult that you know and trust.    -Medical Transport, Uber or Lyft can only be used if patient has a responsible adult to accompany them during ride home.  ~Your ride MUST STAY the entire time until you are discharged~  Please notify the pre-admit department prior to surgery if you use medication transportation so we can verify your arrival/pickup time!   -The patient is responsible for setting up their own  transportation!    Discharge Prescriptions:  Your discharge prescriptions will be sent next door to [facility], unless otherwise discussed with your surgeon. Your  will be responsible for calling the pharmacy ([phone]) to begin the prescription filling process. They will receive a text message with instructions once you are in recovery. Please make sure we have your insurance information and you bring a payment method (cash or card) if needed for prescriptions. If you have  insurance, please let the pre-op nurse know, as the pharmacy does not take this insurance.       *If you are running late or have questions the morning of surgery, please call the Pre-OP Department @ [phone].       *Please Call [facility] Pre-Admit Department for surgery instruction questions:   [phone]     Payment Questions:                Billing Question Numbers:         [phone]

## 2024-11-29 NOTE — ASSESSMENT & PLAN NOTE
CYSTOSCOPY, WITH BLADDER BIOPSY, WITH FULGURATION IF INDICATED planned per Dr. Rachel on 12/3/2024     Known risk factors for perioperative complications: Coronary disease  Diabetes mellitus   MI - 2021  Stroke- 1998     Difficulty with intubation is not anticipated.    Cardiac Risk Estimation: Based on the Revised Cardiac Risk index, patient is a Class III risk with a 10.1% risk of a major cardiac event in a low risk procedure.    1.) Preoperative workup as follows: chest x-ray, ECG, hemoglobin, hematocrit, electrolytes, creatinine, glucose.  2.) Change in medication regimen before surgery: discontinue ASA/Plavix 5 days before surgery or per cardiology recommendation, discontinue NSAIDs 5 days before surgery, hold Metformin 24 hours prior to surgery. Hold all vitamins/supplements for 7 days prior to surgery, with the exception of Potassium and Iron supplementation. Hold semaglutide/tirzepatide for 7 days prior to surgery.   3.) Prophylaxis for cardiac events with perioperative beta-blockers: not indicated.  4.) Invasive hemodynamic monitoring perioperatively: at the discretion of anesthesiologist.  5.) Deep vein thrombosis prophylaxis postoperatively: regimen to be chosen by surgical team.  6.) Surveillance for postoperative MI with ECG immediately postoperatively and on postoperati ve days 1 and 2 AND troponin levels 24 hours postoperatively and on day 4 or hospital discharge (whichever comes first): at the discretion of anesthesiologist.  7.) Current medications which may produce withdrawal symptoms if withheld perioperatively: None   8.) Other measures: Careful attention to perioperative glycemic control (POCT glucose monitoring).  Postoperative hypertension management with IV hydralazine until able to take oral medications.  Postoperative incentive spirometry to prevent pneumonia.   -Oxybutynin continued nightly

## 2024-11-29 NOTE — ASSESSMENT & PLAN NOTE
-moderately controlled   -BP elevated upon exam at 158/68  -patient confirms medication compliance and sites situational anxiety due to upcoming procedure for cause of elevation  -losartan/HCTZ continued-hold morning of surgery  -followed outpatient by Cardiology

## 2024-11-29 NOTE — PROGRESS NOTES
Preoperative History and Physical  [facility]                                                                   Chief Complaint: Preoperative evaluation     History of Present Illness:      Alexander Crawford is a 84 y.o. male who presents to the office today for a preoperative consultation at the request of Dr. Rachel who plans on performing a CYSTOSCOPY, WITH BLADDER BIOPSY, WITH FULGURATION IF INDICATED  on December 3.     Functional Status:      The patient is able to climb a flight of stairs. The patient is able to ambulate independently without difficulty. The patient's functional status is not affected by the surgical problem. The patient's functional status is not affected by shortness of breath, chest pain, dyspnea on exertion and fatigue.    MET score greater than 4    Patient Anesthesia History:      History of Malignant Hyperthermia: no  History of Pseudocholinesterase Deficiency: no  History PONV: no  History of difficult intubation: no  History of delayed emergence: no  History of high fever after anesthesia: no    Family Anesthesia History:      History of Malignant Hyperthermia: no  History of Pseudocholinesterase Deficiency: no    Past Medical History:      Past Medical History:   Diagnosis Date    Bladder cancer     CAD (coronary artery disease)     CVA (cerebral vascular accident)     Diabetes mellitus     Diabetes mellitus, type 2     HLD (hyperlipidemia)     Hypertension     Myocardial infarction         Past Surgical History:      Past Surgical History:   Procedure Laterality Date    BACK SURGERY      3 back surgeries    CAROTID ENDARTERECTOMY Bilateral     CORONARY ANGIOPLASTY      CYSTOSCOPY      CYSTOSCOPY W/ URETERAL STENT PLACEMENT Right 5/3/2024    Procedure: CYSTOSCOPY, WITH URETERAL STENT INSERTION;  Surgeon: Raoul Rachel MD;  Location: [facility];  Service: Urology;  Laterality: Right;    RETROGRADE PYELOGRAPHY Bilateral 5/3/2024     Procedure: PYELOGRAM, RETROGRADE;  Surgeon: Raoul Rachel MD;  Location: [facility] OR;  Service: Urology;  Laterality: Bilateral;    TURBT (TRANSURETHRAL RESECTION OF BLADDER TUMOR)      x2    TURBT (TRANSURETHRAL RESECTION OF BLADDER TUMOR) Bilateral 5/3/2024    Procedure: TURBT (TRANSURETHRAL RESECTION OF BLADDER TUMOR);  Surgeon: Raoul Rachel MD;  Location: [facility] OR;  Service: Urology;  Laterality: Bilateral;        Social History:      Social History     Socioeconomic History    Marital status:    Tobacco Use    Smoking status: Former     Types: Cigarettes    Smokeless tobacco: Never    Tobacco comments:     Quit 30 yrs ago.    Substance and Sexual Activity    Alcohol use: Not Currently     Comment: Ocassionally drinks beer.    Drug use: Never     Social Drivers of Health     Financial Resource Strain: Low Risk  (10/31/2023)    [facility] from [facility] Its Subsidiaries and Affiliates, [facility] and Its Subsidiaries and Affiliates    Overall Financial Resource Strain (CARDIA)     Difficulty of Paying Living Expenses: Not very hard   Food Insecurity: No Food Insecurity (10/31/2023)    Received from [facility] and Its Subsidiaries and Affiliates, [facility] and Its Subsidiaries and Affiliates    Hunger Vital Sign     Worried About Running Out of Food in the Last Year: Never true     Ran Out of Food in the Last Year: Never true   Transportation Needs: No Transportation Needs (10/31/2023)    [facility], [facility]    PRAPARE - Transportation     Lack of Transportation (Medical): No     Lack of Transportation (Non-Medical): No   Physical Activity: Insufficiently Active (10/31/2023)     Received from [facility] and Its Subsidiaries and Affiliates, [facility] and Its Subsidiaries and Affiliates    Exercise Vital Sign     Days of Exercise per Week: 3 days     Minutes of Exercise per Session: 30 min   Housing Stability: Unknown (10/31/2023)    Received from [facility] and Its Subsidiaries and Affiliates, [facility] and Its Subsidiaries and Affiliates    Housing Stability Vital Sign     Number of Places Lived in the Last Year: 1        Family History:      Family History   Problem Relation Name Age of Onset    Lung cancer Father         Allergies:      Review of patient's allergies indicates:  No Known Allergies    Medications:      Current Outpatient Medications   Medication Sig    aspirin (ECOTRIN) 81 MG EC tablet Take 1 tablet by mouth every morning.    clopidogreL (PLAVIX) 75 mg tablet Take 1 tablet by mouth every morning.    gabapentin (NEURONTIN) 300 MG capsule Take 300 mg by mouth Daily.    glipiZIDE (GLUCOTROL) 5 MG tablet Take 5 mg by mouth 2 (two) times daily.    hyoscyamine (LEVSIN) 0.125 mg Subl Place 1 tablet (0.125 mg total) under the tongue every 6 (six) hours as needed (Take 1 under the tongue every 6 hours as needed for bladder spasms).    isosorbide mononitrate (IMDUR) 120 MG 24 hr tablet Take 120 mg by mouth once daily.    losartan-hydrochlorothiazide 50-12.5 mg (HYZAAR) 50-12.5 mg per tablet Take 1 tablet by mouth once daily.    metFORMIN (GLUCOPHAGE) 1000 MG tablet Take 1,000 mg by mouth 2 (two) times daily.    oxybutynin (DITROPAN-XL) 10 MG 24 hr tablet Take 1 tablet (10 mg total) by mouth nightly.    oxybutynin (DITROPAN-XL) 5 MG TR24 Take 1 tablet (5 mg total) by mouth nightly.    simvastatin (ZOCOR) 20 MG tablet Take 20 mg by mouth.    sulfamethoxazole-trimethoprim 800-160mg (BACTRIM DS) 800-160 mg Tab Take 1 tablet by mouth  2 (two) times daily.     Current Facility-Administered Medications   Medication    BCG live (PILO) 50 mg in sodium chloride 0.9% 50 mL bladder instillation       Vitals:      Vitals:    11/29/24 1407   BP: (!) 158/68   Pulse: 65   Temp: 98.1 °F (36.7 °C)       Review of Systems:        Constitutional: Negative for fever, chills, weight loss, malaise/fatigue and diaphoresis.   HENT: Negative for hearing loss, ear pain, nosebleeds, congestion, sore throat, neck pain, tinnitus and ear discharge.    Eyes: Negative for blurred vision, double vision, photophobia, pain, discharge and redness.   Respiratory: Negative for cough, hemoptysis, sputum production, shortness of breath, wheezing and stridor.    Cardiovascular: Negative for chest pain, palpitations, orthopnea, claudication, leg swelling and PND.   Gastrointestinal: Negative for heartburn, nausea, vomiting, abdominal pain, diarrhea, constipation, blood in stool and melena.   Genitourinary: Negative for dysuria, urgency, frequency, hematuria and flank pain.   Musculoskeletal: Negative for myalgias, back pain, joint pain and falls.   Skin: Negative for itching and rash.   Neurological: Negative for dizziness, tingling, tremors, sensory change, speech change, focal weakness, seizures, loss of consciousness, weakness and headaches.   Endo/Heme/Allergies: Negative for environmental allergies and polydipsia. Does not bruise/bleed easily.   Psychiatric/Behavioral: Negative for depression, suicidal ideas, hallucinations, memory loss and substance abuse. The patient is not nervous/anxious and does not have insomnia.    All 14 systems reviewed and negative except as noted above.    Physical Exam:      Constitutional: Appears well-developed, well-nourished and in no acute distress.  Patient is oriented to person, place, and time.   Head: Normocephalic and atraumatic. Mucous membranes moist.  Neck: Neck supple no mass. Normal ROM to cervical spine   Cardiovascular: Normal rate  and regular rhythm.  S1 S2 appreciated by ascultation.  Pulmonary/Chest: Effort normal and clear to auscultation bilaterally. No respiratory distress.   Abdomen: Soft. Non-tender and non-distended. Bowel sounds are normal.   Neurological: Patient is alert and oriented to person, place and time. Moves all extremities.  Skin: Warm and dry. No lesions.  Extremities: No clubbing, cyanosis or edema.    Laboratory data:      Reviewed and noted in plan where applicable. Please see chart for full laboratory data.    Lab Results   Component Value Date    WBC 4.91 11/13/2024    HGB 11.4 (L) 11/13/2024    HCT 36.2 (L) 11/13/2024     (H) 11/13/2024     11/13/2024     Comprehensive Metabolic Panel  Order: [id number]  Status: Final result  Dx: Pre-op testing           Component Ref Range & Units 2 wk ago 1 mo ago 7 mo ago   Sodium 136 - 145 mmol/L 137 138 138   Potassium 3.5 - 5.1 mmol/L 5.1 4.9 4.1   Chloride 95 - 110 mmol/L 106 106 102   CO2 23 - 29 mmol/L 21 Low  26 25   Glucose 70 - 110 mg/dL 113 High  142 High  93   BUN 8 - 23 mg/dL 18 20 19   Creatinine 0.5 - 1.4 mg/dL 1.2 1.6 High  1.1   Calcium 8.7 - 10.5 mg/dL 9.2 9.5 9.5   Total Protein 6.0 - 8.4 g/dL 6.9 7.0 7.4   Albumin 3.5 - 5.2 g/dL 3.9 4.0 4.2   Total Bilirubin 0.1 - 1.0 mg/dL 0.4 0.6 CM 0.6 CM   Comment: For infants and newborns, interpretation of results should be based  on gestational age, weight and in agreement with clinical  observations.    Premature Infant recommended reference ranges:  Up to 24 hours.............<8.0 mg/dL  Up to 48 hours............<12.0 mg/dL  3-5 days..................<15.0 mg/dL  6-29 days.................<15.0 mg/dL   Alkaline Phosphatase 40 - 150 U/L 64 61 R 71 R   AST 10 - 40 U/L 12 13 12   ALT 10 - 44 U/L 7 Low  8 Low  7 Low    eGFR >60 mL/min/1.73 m^2 >60.0 42.5 Abnormal  >60.0   Anion Gap 8 - 16 mmol/L 10 6 Low  11   Resulting Agency  [facility]             Specimen Collected: 11/13/24 14:54 CST Last Resulted:  11/13/24 21:50 CST       Predictors of intubation difficulty:       Morbid obesity? no   Anatomically abnormal facies? no   Prominent incisors? no   Receding mandible? no   Short, thick neck? no   Neck range of motion: normal   Dentition: No chipped, loose, or missing teeth.  Based on the Modified Mallampati, patient is a mallampati score: III (soft and hard palate and base of uvula visible)    Cardiographics:      ECG: Normal sinus rhythm   Right bundle branch block   Abnormal ECG   No previous ECGs available   Confirmed by Lisa Khan ([id number]) on 11/13/2024 4:13:00 PM       Echocardiogram: not indicated    Imaging:      Chest x-ray: No confluent airspace opacity or consolidation.  There is no evidence of pleural effusion, pneumothorax, or other acute pulmonary disease.  The cardiomediastinal silhouette is within normal limits.  No acute osseous abnormality is evident.  Moderate scattered degenerative change and atherosclerotic disease.  No acute cardiopulmonary abnormality per imaging on 11/13/2024.     Assessment and Plan:      1. Malignant neoplasm of overlapping sites of bladder  Assessment & Plan:  CYSTOSCOPY, WITH BLADDER BIOPSY, WITH FULGURATION IF INDICATED planned per Dr. Rachel on 12/3/2024. Hx of bladder cancer at multiple sites s/p transurethral resection of several bladder tumors, bilateral retrograde pyelograms, and right ureteral stent placement on 5-3-24. BCG treatments x 6 weeks completed starting on 12-6-23. Second round of 6 weeks of BCG treatments from 6-6-24 to 8-19-24 per Urology encounter on 11/13/2024.     Known risk factors for perioperative complications: Coronary disease  Diabetes mellitus   MI - 2021  Stroke- 1998     Difficulty with intubation is not anticipated.    Cardiac Risk Estimation: Based on the Revised Cardiac Risk index, patient is a Class III risk with a 10.1% risk of a major cardiac event in a low risk procedure.    1.) Preoperative workup as follows: chest x-ray, ECG,  hemoglobin, hematocrit, electrolytes, creatinine, glucose.  2.) Change in medication regimen before surgery: discontinue ASA/Plavix 5 days before surgery or per cardiology recommendation, discontinue NSAIDs 5 days before surgery, hold Metformin 24 hours prior to surgery. Hold all vitamins/supplements for 7 days prior to surgery, with the exception of Potassium and Iron supplementation. Hold semaglutide/tirzepatide for 7 days prior to surgery.   3.) Prophylaxis for cardiac events with perioperative beta-blockers: not indicated.  4.) Invasive hemodynamic monitoring perioperatively: at the discretion of anesthesiologist.  5.) Deep vein thrombosis prophylaxis postoperatively: regimen to be chosen by surgical team.  6.) Surveillance for postoperative MI with ECG immediately postoperatively and on postoperati ve days 1 and 2 AND troponin levels 24 hours postoperatively and on day 4 or hospital discharge (whichever comes first): at the discretion of anesthesiologist.  7.) Current medications which may produce withdrawal symptoms if withheld perioperatively: None   8.) Other measures: Careful attention to perioperative glycemic control (POCT glucose monitoring).  Postoperative hypertension management with IV hydralazine until able to take oral medications.  Postoperative incentive spirometry to prevent pneumonia.   -Oxybutynin continued nightly       2. Pre-op exam  -     Ambulatory referral/consult to Pre-Admit    3. Type 2 diabetes mellitus with hyperosmolarity without coma, without long-term current use of insulin  Assessment & Plan:  -hemoglobin A1c 6.7 on 11/29/2024  -glipizide continued- hold evening prior to and morning of procedure   -Metformin continued- hold 24 hours prior to procedure   -followed outpatient by PCP     Orders:  -     Hemoglobin A1C; Future; Expected date: 11/29/2024    4. Coronary artery disease, unspecified vessel or lesion type, unspecified whether angina present, unspecified whether native or  transplanted heart  Assessment & Plan:  CAD status post PTCA with stents to the RCA and with high-grade stenosis of the diagonal branches (to be managed medically) in 5/2020.   LHC showed Cardiac catheterization showing patent left main, 30 to 40% stenosis of the proximal LAD, 95% stenosis of the first small diagonal branch, 50 to 70% stenosis of the small second diagonal branch, 30 to 40% stenosis of the circumflex, 50 to 70% stenosis of the proximal RCA and 95 to 99% stenosis of the PDA by study dated 5/2020.   -ASA/Plavix to be held per Cardiology recommendations   -Simvastatin continued nightly   -Isosorbide mononitrate continued- take morning of surgery   -followed outpatient by Dr. Downs (Cardiology)- cardiac clearance with recommendations obtained per office of primary surgeon      5. Cerebrovascular accident (CVA), unspecified mechanism  Assessment & Plan:  -hx of CVA affecting speech in 1995- pt reports resolution of speech deficits and denies any residual   -ASA/Plavix to held per cardiology recommendation   -Simvastatin continued nightly   -patient not followed outpatient by Neurology at this time       6. Hypertension, unspecified type  Assessment & Plan:  -moderately controlled   -BP elevated upon exam at 158/68  -patient confirms medication compliance and sites situational anxiety due to upcoming procedure for cause of elevation  -losartan/HCTZ continued-hold morning of surgery  -followed outpatient by Cardiology      7. Hyperlipidemia, unspecified hyperlipidemia type  Assessment & Plan:  -simvastatin continued nightly       8. Anemia, unspecified type  Assessment & Plan:  -H/H 11.4/36.2-  per CBC on 11/13/2024   -pt to follow up with PCP for further evaluation                Electronically signed by Sushma Diaz NP on 11/29/2024 at 2:15 PM.

## 2024-11-29 NOTE — DISCHARGE INSTRUCTIONS
To confirm, your doctor has instructed you: Surgery is scheduled for 12/03/24.   Pre admit office will call the afternoon prior to surgery between 1PM and 3PM with arrival time.    Surgery will be at [facility],  The address is [address]. [place] [address].      IMPORTANT INSTRUCTIONS!    Do not eat or drink after 12 midnight, including water. Do not smoke or use chewing tobacco after 12 midnight!  OK to brush teeth, but no gum, candy, or mints!      *Take only these medicines with a small swallow of water-morning of surgery*     ISOSORBIDE MONONITRATE      >>>FOLLOW YOUR SURGEON/CARDIOLOGIST INSTRUCTIONS REGARDING ASPIRIN/PLAVIX<<<       ____ Stop taking all vitamins, herbal supplements, Aspirin, & NSAIDS (Ibuprofen, Advil, Aleve) 7 days prior to surgery, as these can thin your blood.    ____ Weight loss medication, such as Adipex and Phentermine, must be stopped 14 days prior to surgery, no exceptions!    *Diabetic Patients: If you take diabetic or weight loss medication, do NOT take morning of surgery unless instructed by Doctor. Metformin to be stopped 24 hrs prior to surgery. DO NOT take short-acting insulin the day of surgery. Only take HALF of your regular dose of long-acting insulin the night before surgery, unless instructed otherwise. Blood sugars will be checked in pre-op.   ~Ozempic/Mounjaro/Wegovy/Trulicity/Semaglutide injections must be stopped 7 days prior to surgery.     Please notify MD office if you develop an active infection, are prescribed antibiotics by someone other than the surgeon doing your surgery, or visit urgent care/ER.      Bathing Instructions:   The night before surgery and the morning prior to coming to the hospital:    - Shower & rinse your body as usual with anti-bacterial Soap (Dial or Lever 2000)   -Hibiclens (if indicated) use AFTER anti-bacterial soap; 1 packet PM/1 packet in AM on surgical site only   -Do not use hibiclens on your head, face, or  genitals.    -Do not wash with anti-bacterial soap after you use the hibiclens.    -Do not shave surgical site 5-7 days prior to surgery.    -Pubic hair 7 days prior to surgery (OBGYN/Urology only).       Additional Instructions:   __ No makeup, powder, lotions, creams, or body spray to skin   __ No deodorant if you are having: breast procedure, PORT, or upper shoulder surgery!   __ No nail polish or artificial nails       **SURGERY WILL BE CANCELLED IF ARTIFICIAL/NAIL POLISH IS PRESENT!!!**  __ Please remove all piercings and leave all jewelry at home.    **SURGERY WILL BE CANCELLED IF PIERCINGS ARE PRESENT!!!**    __ Dentures, Hearing Aids and Contact Lens need to be removed prior to the start of surgery.    __ Avoid Alcoholic beverages 3 days prior to surgery, as it can thin the blood, unless told otherwise by pre-admit department.  __ Females: may need to give a urine sample the morning of surgery;   **Please ask  for a specimen cup if you need to use the restroom prior to being called into pre-op.**  __ Males: Stop ED medications (Viagra, Cialis) 24 hrs prior to surgery.  __ Wear clean, loose-fitting clothing. Allow for dressings/bandages/surgical equipment   __ You must have transportation, and they MUST stay the entire time.   __  Bring photo ID and insurance information to [facility] Visitor/Ride Policy:   Only 1 adult allowed (over the age of 18) to accompany you and MUST STAY through the entire length of admission.     -Must have a ride home from a responsible adult that you know and trust.    -Medical Transport, Uber or Lyft can only be used if patient has a responsible adult to accompany them during ride home.  ~Your ride MUST STAY the entire time until you are discharged~  Please notify the pre-admit department prior to surgery if you use medication transportation so we can verify your arrival/pickup time!   -The patient is responsible for setting up their own  transportation!    Discharge Prescriptions:  Your discharge prescriptions will be sent next door to [facility] pharmacy, unless otherwise discussed with your surgeon. Your  will be responsible for calling the pharmacy ([phone]) to begin the prescription filling process. They will receive a text message with instructions once you are in recovery. Please make sure we have your insurance information and you bring a payment method (cash or card) if needed for prescriptions. If you have  insurance, please let the pre-op nurse know, as the pharmacy does not take this insurance.       *If you are running late or have questions the morning of surgery, please call the Pre-OP Department @ [phone].       *Please Call [facility] Pre-Admit Department for surgery instruction questions:   [phone]     Payment Questions:                Billing Question Numbers:         [phone]

## 2024-12-02 ENCOUNTER — ANESTHESIA EVENT (OUTPATIENT)
Dept: SURGERY | Facility: HOSPITAL | Age: 84
End: 2024-12-02
Payer: MEDICARE

## 2024-12-02 NOTE — ANESTHESIA PREPROCEDURE EVALUATION
12/02/2024  Alexander Crawford is a 84 y.o., male.    Patient Active Problem List   Diagnosis    Malignant neoplasm of overlapping sites of bladder    Diabetes mellitus, type 2    CAD (coronary artery disease)    CVA (cerebral vascular accident)    Hypertension    Hyperlipidemia    Anemia     Past Surgical History:   Procedure Laterality Date    BACK SURGERY      3 back surgeries    CAROTID ENDARTERECTOMY Bilateral     CORONARY ANGIOPLASTY      CYSTOSCOPY      CYSTOSCOPY W/ URETERAL STENT PLACEMENT Right 5/3/2024    Procedure: CYSTOSCOPY, WITH URETERAL STENT INSERTION;  Surgeon: Raoul Rachel MD;  Location: [facility] OR;  Service: Urology;  Laterality: Right;    RETROGRADE PYELOGRAPHY Bilateral 5/3/2024    Procedure: PYELOGRAM, RETROGRADE;  Surgeon: Raoul Rachel MD;  Location: [facility] OR;  Service: Urology;  Laterality: Bilateral;    TURBT (TRANSURETHRAL RESECTION OF BLADDER TUMOR)      x2    TURBT (TRANSURETHRAL RESECTION OF BLADDER TUMOR) Bilateral 5/3/2024    Procedure: TURBT (TRANSURETHRAL RESECTION OF BLADDER TUMOR);  Surgeon: Raoul Rachel MD;  Location: [facility] OR;  Service: Urology;  Laterality: Bilateral;       Pre-op Assessment    I have reviewed the Patient Summary Reports.    I have reviewed the NPO Status.   I have reviewed the Medications.     Review of Systems  Anesthesia Hx:  No problems with previous Anesthesia   History of prior surgery of interest to airway management or planning:            Denies Personal Hx of Anesthesia complications.                    Social:  Non-Smoker, Former Smoker       Hematology/Oncology:  Hematology Normal                  Hematology Comments: Chronic AC                    Cardiovascular:     Hypertension  Past MI CAD           hyperlipidemia   ECG has been reviewed. Pt has had balloon angioplasty approx 3 years ago.  Occasional chest pain treated with isosorbide.   This has been stable.  Saw his cardiologist recently.                           Pulmonary:  Pulmonary Normal                       Renal/:  Renal/ Normal                 Hepatic/GI:  Hepatic/GI Normal                    Neurological:   CVA                                    Endocrine:  Diabetes               Physical Exam  General: Well nourished    Airway:  Mallampati: III   Mouth Opening: Normal  TM Distance: Normal  Tongue: Normal  Neck ROM: Normal ROM    Dental:  Intact        Anesthesia Plan  Type of Anesthesia, risks & benefits discussed:    Anesthesia Type: Gen ETT, Gen Supraglottic Airway  Intra-op Monitoring Plan: Standard ASA Monitors  Post Op Pain Control Plan: multimodal analgesia  Induction:  IV  Airway Plan: , Post-Induction  Informed Consent: Informed consent signed with the Patient and all parties understand the risks and agree with anesthesia plan.  All questions answered.   ASA Score: 3  Day of Surgery Review of History & Physical: H&P Update referred to the surgeon/provider.    Ready For Surgery From Anesthesia Perspective.     .      Chemistry        Component Value Date/Time     11/13/2024 1454    K 5.1 11/13/2024 1454     11/13/2024 1454    CO2 21 (L) 11/13/2024 1454    BUN 18 11/13/2024 1454    CREATININE 1.2 11/13/2024 1454     (H) 11/13/2024 1454        Component Value Date/Time    CALCIUM 9.2 11/13/2024 1454    ALKPHOS 64 11/13/2024 1454    AST 12 11/13/2024 1454    ALT 7 (L) 11/13/2024 1454    BILITOT 0.4 11/13/2024 1454        Lab Results   Component Value Date    WBC 4.91 11/13/2024    HGB 11.4 (L) 11/13/2024    HCT 36.2 (L) 11/13/2024     (H) 11/13/2024     11/13/2024          None

## 2024-12-02 NOTE — PLAN OF CARE
Called and spoke with the patient about the following:      Your Surgery arrival time is at 8:15am on 12/3/24 at [facility].   The [place] is [address]. [place] [address].       *If you are running late or have questions the morning of surgery, please call the Pre-OP Department @ [phone].     Do not eat after 12 midnight, Do not smoke or use chewing tobacco after 12 midnight!  OK to brush teeth, but no gum, candy, or mints!      Patients should stop full meals at midnight, but they can consume clear liquids up to 2 hours prior to scheduled arrival time.  Clear liquids include Gatorade, water, soda, black coffee or tea (no milk or creamer), and clear juices.  Clear liquids do NOT include anything with pulp or food particles (Chicken broth, ice cream, yogurt, Jello, etc.)     Additional Instructions:  You may be required to provide a urine sample prior to procedure;   Please ask  for a specimen cup if you need to use the restroom prior to being called into pre-op.     Please come to the main lobby and be prepared to show your photo ID and insurance card.       Only take specific medications discussed with the Pre-Admit Nurse.      Please call with any questions or concerns ([phone] or [phone])    [facility] Visitor/Ride Policy:   Adults:  Only 1 adult allowed (must be 18 or older) to accompany you and MUST STAY through the entire length of admission.     -Must have a ride home from a responsible adult that you know and trust.    -Medical Transport, Uber or Lyft can only be used if patient has a responsible adult to accompany them during ride home.    Pediatrics:  Pediatric patients are encouraged to have 2 adults (must be 18 or older) accompany them to the surgery center.   ~If the parent/legal guardian is unable to stay for the duration of the surgery time,   you MUST have someone over the age of 18 able to stay with the patient until time of discharge.     ~The  parent/legal guardian must be available to be reached by phone at all times if they are unable to stay with the patient.

## 2024-12-03 ENCOUNTER — HOSPITAL ENCOUNTER (OUTPATIENT)
Facility: HOSPITAL | Age: 84
Discharge: HOME | End: 2024-12-03
Attending: UROLOGY | Admitting: UROLOGY
Payer: MEDICARE

## 2024-12-03 ENCOUNTER — ANESTHESIA (OUTPATIENT)
Dept: SURGERY | Facility: HOSPITAL | Age: 84
End: 2024-12-03
Payer: MEDICARE

## 2024-12-03 ENCOUNTER — HOSPITAL ENCOUNTER (OUTPATIENT)
Dept: RADIOLOGY | Facility: HOSPITAL | Age: 84
Discharge: HOME | End: 2024-12-03
Attending: UROLOGY | Admitting: UROLOGY
Payer: MEDICARE

## 2024-12-03 ENCOUNTER — TELEPHONE (OUTPATIENT)
Dept: UROLOGY | Facility: CLINIC | Age: 84
End: 2024-12-03
Payer: MEDICARE

## 2024-12-03 VITALS
OXYGEN SATURATION: 95 % | DIASTOLIC BLOOD PRESSURE: 65 MMHG | HEART RATE: 53 BPM | WEIGHT: 186.31 LBS | BODY MASS INDEX: 24.58 KG/M2 | RESPIRATION RATE: 13 BRPM | TEMPERATURE: 99 F | SYSTOLIC BLOOD PRESSURE: 139 MMHG

## 2024-12-03 DIAGNOSIS — R52 PAIN: Primary | ICD-10-CM

## 2024-12-03 DIAGNOSIS — C67.8 MALIGNANT NEOPLASM OF OVERLAPPING SITES OF BLADDER: ICD-10-CM

## 2024-12-03 LAB — POCT GLUCOSE: 240 MG/DL (ref 70–110)

## 2024-12-03 PROCEDURE — 74018 RADEX ABDOMEN 1 VIEW: CPT | Mod: TC

## 2024-12-03 PROCEDURE — 25000003 PHARM REV CODE 250: Performed by: UROLOGY

## 2024-12-03 PROCEDURE — 36000707: Performed by: UROLOGY

## 2024-12-03 PROCEDURE — 25000003 PHARM REV CODE 250: Performed by: NURSE ANESTHETIST, CERTIFIED REGISTERED

## 2024-12-03 PROCEDURE — 36000706: Performed by: UROLOGY

## 2024-12-03 PROCEDURE — C1769 GUIDE WIRE: HCPCS | Performed by: UROLOGY

## 2024-12-03 PROCEDURE — C1758 CATHETER, URETERAL: HCPCS | Performed by: UROLOGY

## 2024-12-03 PROCEDURE — 88305 TISSUE EXAM BY PATHOLOGIST: CPT | Performed by: STUDENT IN AN ORGANIZED HEALTH CARE EDUCATION/TRAINING PROGRAM

## 2024-12-03 PROCEDURE — 63600175 PHARM REV CODE 636 W HCPCS: Performed by: NURSE ANESTHETIST, CERTIFIED REGISTERED

## 2024-12-03 PROCEDURE — 88342 IMHCHEM/IMCYTCHM 1ST ANTB: CPT | Mod: 59 | Performed by: STUDENT IN AN ORGANIZED HEALTH CARE EDUCATION/TRAINING PROGRAM

## 2024-12-03 PROCEDURE — 74420 UROGRAPHY RTRGR +-KUB: CPT | Mod: 26,,, | Performed by: UROLOGY

## 2024-12-03 PROCEDURE — 37000009 HC ANESTHESIA EA ADD 15 MINS: Performed by: UROLOGY

## 2024-12-03 PROCEDURE — 71000033 HC RECOVERY, INTIAL HOUR: Performed by: UROLOGY

## 2024-12-03 PROCEDURE — 37000008 HC ANESTHESIA 1ST 15 MINUTES: Performed by: UROLOGY

## 2024-12-03 PROCEDURE — 71000015 HC POSTOP RECOV 1ST HR: Performed by: UROLOGY

## 2024-12-03 PROCEDURE — 52204 CYSTOSCOPY W/BIOPSY(S): CPT | Mod: ,,, | Performed by: UROLOGY

## 2024-12-03 RX ORDER — CEFTRIAXONE 1 G/1
1 INJECTION, POWDER, FOR SOLUTION INTRAMUSCULAR; INTRAVENOUS
Status: DISCONTINUED | OUTPATIENT
Start: 2024-12-03 | End: 2024-12-03 | Stop reason: HOSPADM

## 2024-12-03 RX ORDER — FENTANYL CITRATE 50 UG/ML
25 INJECTION, SOLUTION INTRAMUSCULAR; INTRAVENOUS EVERY 5 MIN PRN
Status: DISCONTINUED | OUTPATIENT
Start: 2024-12-03 | End: 2024-12-03 | Stop reason: HOSPADM

## 2024-12-03 RX ORDER — GLUCAGON 1 MG
1 KIT INJECTION
Status: DISCONTINUED | OUTPATIENT
Start: 2024-12-03 | End: 2024-12-03 | Stop reason: HOSPADM

## 2024-12-03 RX ORDER — HYDROCODONE BITARTRATE AND ACETAMINOPHEN 5; 325 MG/1; MG/1
1 TABLET ORAL EVERY 6 HOURS PRN
Qty: 25 TABLET | Refills: 0 | Status: SHIPPED | OUTPATIENT
Start: 2024-12-03

## 2024-12-03 RX ORDER — HYOSCYAMINE SULFATE 0.12 MG/1
0.12 TABLET SUBLINGUAL EVERY 6 HOURS PRN
Qty: 30 TABLET | Refills: 0 | Status: SHIPPED | OUTPATIENT
Start: 2024-12-03

## 2024-12-03 RX ORDER — LIDOCAINE HYDROCHLORIDE 20 MG/ML
INJECTION, SOLUTION EPIDURAL; INFILTRATION; INTRACAUDAL; PERINEURAL
Status: DISCONTINUED | OUTPATIENT
Start: 2024-12-03 | End: 2024-12-03

## 2024-12-03 RX ORDER — ONDANSETRON HYDROCHLORIDE 2 MG/ML
INJECTION, SOLUTION INTRAVENOUS
Status: DISCONTINUED | OUTPATIENT
Start: 2024-12-03 | End: 2024-12-03

## 2024-12-03 RX ORDER — DEXAMETHASONE SODIUM PHOSPHATE 4 MG/ML
INJECTION, SOLUTION INTRA-ARTICULAR; INTRALESIONAL; INTRAMUSCULAR; INTRAVENOUS; SOFT TISSUE
Status: DISCONTINUED | OUTPATIENT
Start: 2024-12-03 | End: 2024-12-03

## 2024-12-03 RX ORDER — ACETAMINOPHEN 10 MG/ML
INJECTION, SOLUTION INTRAVENOUS
Status: DISCONTINUED | OUTPATIENT
Start: 2024-12-03 | End: 2024-12-03

## 2024-12-03 RX ORDER — PROPOFOL 10 MG/ML
VIAL (ML) INTRAVENOUS
Status: DISCONTINUED | OUTPATIENT
Start: 2024-12-03 | End: 2024-12-03

## 2024-12-03 RX ORDER — CEFTRIAXONE 2 G/1
INJECTION, POWDER, FOR SOLUTION INTRAMUSCULAR; INTRAVENOUS
Status: DISCONTINUED
Start: 2024-12-03 | End: 2024-12-03 | Stop reason: HOSPADM

## 2024-12-03 RX ORDER — LIDOCAINE HYDROCHLORIDE 20 MG/ML
JELLY TOPICAL
Status: DISCONTINUED | OUTPATIENT
Start: 2024-12-03 | End: 2024-12-03 | Stop reason: HOSPADM

## 2024-12-03 RX ORDER — ONDANSETRON HYDROCHLORIDE 2 MG/ML
4 INJECTION, SOLUTION INTRAVENOUS DAILY PRN
Status: DISCONTINUED | OUTPATIENT
Start: 2024-12-03 | End: 2024-12-03 | Stop reason: HOSPADM

## 2024-12-03 RX ORDER — OXYCODONE AND ACETAMINOPHEN 5; 325 MG/1; MG/1
1 TABLET ORAL
Status: DISCONTINUED | OUTPATIENT
Start: 2024-12-03 | End: 2024-12-03 | Stop reason: HOSPADM

## 2024-12-03 RX ORDER — MEPERIDINE HYDROCHLORIDE 25 MG/ML
12.5 INJECTION INTRAMUSCULAR; INTRAVENOUS; SUBCUTANEOUS ONCE AS NEEDED
Status: DISCONTINUED | OUTPATIENT
Start: 2024-12-03 | End: 2024-12-03 | Stop reason: HOSPADM

## 2024-12-03 RX ORDER — LIDOCAINE HYDROCHLORIDE 20 MG/ML
JELLY TOPICAL
Status: DISCONTINUED
Start: 2024-12-03 | End: 2024-12-03 | Stop reason: HOSPADM

## 2024-12-03 RX ORDER — FENTANYL CITRATE 50 UG/ML
INJECTION, SOLUTION INTRAMUSCULAR; INTRAVENOUS
Status: DISCONTINUED | OUTPATIENT
Start: 2024-12-03 | End: 2024-12-03

## 2024-12-03 RX ORDER — ROCURONIUM BROMIDE 10 MG/ML
INJECTION, SOLUTION INTRAVENOUS
Status: DISCONTINUED | OUTPATIENT
Start: 2024-12-03 | End: 2024-12-03

## 2024-12-03 RX ADMIN — PROPOFOL 100 MG: 10 INJECTION, EMULSION INTRAVENOUS at 10:12

## 2024-12-03 RX ADMIN — SUGAMMADEX 200 MG: 100 INJECTION, SOLUTION INTRAVENOUS at 11:12

## 2024-12-03 RX ADMIN — SODIUM CHLORIDE: 9 INJECTION, SOLUTION INTRAVENOUS at 10:12

## 2024-12-03 RX ADMIN — ROCURONIUM BROMIDE 5 MG: 10 SOLUTION INTRAVENOUS at 10:12

## 2024-12-03 RX ADMIN — FENTANYL CITRATE 25 MCG: 50 INJECTION, SOLUTION INTRAMUSCULAR; INTRAVENOUS at 11:12

## 2024-12-03 RX ADMIN — FENTANYL CITRATE 50 MCG: 50 INJECTION, SOLUTION INTRAMUSCULAR; INTRAVENOUS at 11:12

## 2024-12-03 RX ADMIN — ONDANSETRON 4 MG: 2 INJECTION INTRAMUSCULAR; INTRAVENOUS at 11:12

## 2024-12-03 RX ADMIN — FENTANYL CITRATE 25 MCG: 50 INJECTION, SOLUTION INTRAMUSCULAR; INTRAVENOUS at 10:12

## 2024-12-03 RX ADMIN — ROCURONIUM BROMIDE 45 MG: 10 SOLUTION INTRAVENOUS at 10:12

## 2024-12-03 RX ADMIN — LIDOCAINE HYDROCHLORIDE 80 MG: 20 INJECTION, SOLUTION EPIDURAL; INFILTRATION; INTRACAUDAL; PERINEURAL at 10:12

## 2024-12-03 RX ADMIN — DEXAMETHASONE SODIUM PHOSPHATE 4 MG: 4 INJECTION, SOLUTION INTRA-ARTICULAR; INTRALESIONAL; INTRAMUSCULAR; INTRAVENOUS; SOFT TISSUE at 11:12

## 2024-12-03 RX ADMIN — ACETAMINOPHEN 1000 MG: 10 INJECTION, SOLUTION INTRAVENOUS at 11:12

## 2024-12-03 RX ADMIN — CEFTRIAXONE SODIUM 2 G: 1 INJECTION, POWDER, FOR SOLUTION INTRAMUSCULAR; INTRAVENOUS at 10:12

## 2024-12-03 NOTE — OP NOTE
Surgeon:  Dr Raoul Rachel.    Date:  12-3-2024.    Pre operative diagnosis:  1) Bladder cancer in overlapping sites.    Post operative diagnosis:  1) Bladder cancer in overlapping sites.  2) Three bladder lesions.    Surgery:  1) Cystoscopy with bladder biopsies with fulguration.  2) Bilateral retrograde pyelograms.    Anesthesia:  General.    Estimated blood loss:  1 ml.    Complications:  None.    Specimens:  Bladder lesions for permanent specimen.    Procedure in details:  Risks and benefits of the surgery were explained to the patient prior to the surgery and the patient expressed understanding.  All questions were answered by me to the patient's apparent understanding and to the patient's apparent satisfaction.  Surgery consent was signed by the patient prior to the surgery.  The patient was taken to the OR and placed in the supine position initially.  Anesthesia was administered by the Anesthesia team.  The patient was then placed in the dorsal lithotomy position.  The patient was prepped and drapped in the usual sterile fashion.  An IV antibiotic was given to the patient prior to the surgery.  A timeout was done prior to the surgery.  A 21 F rigid cystoscope was advanced through the urethra into his bladder.  The bladder was inspected in a systematic fashion.   His bilateral ureteral orifices are orthotopic and patent with clear efflux.  There are 3 small red flat bladder lesions seen.  Two of these red flat bladder lesions are located at the midline posterior bladder wall (one just behind the trigone and the other lesion is more posteriorly located.  The other red bladder bladder lesion is located at the left side of the dome of his bladder.  No papillary urothelial tumor is seen.  No urothelial lesion is seen within his prostatic urethra.  His prostate is mildly enlarged with mildly enlarged lateral lobes and no median lobe of his prostate.  No bladder stone is seen and no foreign body is seen.  I used the  biopsy forceps and I performed two bladder biopsies of his approximately 1.5 cm most posteriorly located midline posterior bladder wall red flat lesion.  I used the biopsy forceps and I performed one bladder biopsy of his approximately 1 cm midline posterior bladder wall red flat lesion that is closer to the trigone.  I used the biopsy forceps and I performed two bladder biopsies of his approximately 2 cm left side of the dome of his bladder red flat lesion.  No visible bladder lesion was seen at this time.  I used the bugbee to fulgurate all of the  bladder biopsy sites.  I turned down the sterile water flow and there was no bleeding from any of the bladder biopsy sites.  No visible tumor or abnormal urothelial lesion was seen at this time.  All of the bladder biopsy sites were labeled according to their location within the bladder, and I asked the nurse to send all of the bladder biopsies to pathology for permanent specimen.  His right ureteral orifice was cannulated with a 5 F open ended ureteral catheter and a gentle right retrograde pyelogram was shot.  This was normal with no filling defect, no hydronephrosis, no extravasation, and good drainage of his right kidney.  His left ureteral orifice was cannulated with a 5 F open ended ureteral catheter and a gentle left retrograde pyelogram was shot.  This was normal with no filling defect, no hydronephrosis, no extravasation, and good drainage of his left kidney.  I checked all the bladder biopsy sites again and there was no bleeding from any of the bladder biopsy sites.    The cystoscope was then removed from his body.  I placed a 20 F 2 way stacy catheter through the urethra into his bladder.  When yellow colored urine drained from his bladder, 10 cc of sterile water was inflated into his stacy catheter balloon.  I manually irrigated his stacy catheter with 50 cc of sterile water and his urine is clear and his catheter irrigated easily.  A post operative timeout  was done at the end of the procedure.  The patient was taken to the recovery room in stable condition at the end of the procedure.

## 2024-12-03 NOTE — ANESTHESIA PROCEDURE NOTES
Intubation    Date/Time: 12/3/2024 10:44 AM    Performed by: Layne Rdz CRNA  Authorized by: Edvin Epperson MD    Intubation:     Induction:  Intravenous    Intubated:  Postinduction    Mask Ventilation:  Easy mask    Attempts:  1    Attempted By:  CRNA    Method of Intubation:  Video laryngoscopy    Blade:  Agudelo 3    Laryngeal View Grade: Grade I - full view of cords      Difficult Airway Encountered?: No      Complications:  None    Airway Device:  Oral endotracheal tube    Airway Device Size:  7.5    Style/Cuff Inflation:  Cuffed    Inflation Amount (mL):  7    Tube secured:  23    Secured at:  The lips    Complicating Factors:  None    Findings Post-Intubation:  BS equal bilateral and atraumatic/condition of teeth unchanged

## 2024-12-03 NOTE — ANESTHESIA POSTPROCEDURE EVALUATION
Anesthesia Post Evaluation    Patient: Alexander Crawford    Procedure(s) Performed: Procedure(s) (LRB):  CYSTOSCOPY, WITH BLADDER BIOPSY, WITH FULGURATION IF INDICATED (Bilateral)  PYELOGRAM, RETROGRADE (Bilateral)    Final Anesthesia Type: general      Patient location during evaluation: PACU  Patient participation: Yes- Able to Participate  Level of consciousness: awake  Post-procedure vital signs: reviewed and stable  Pain management: adequate  Airway patency: patent    PONV status at discharge: No PONV  Anesthetic complications: no      Cardiovascular status: stable  Respiratory status: unassisted  Hydration status: euvolemic  Follow-up not needed.              Vitals Value Taken Time   /56 12/03/24 1146   Temp 37 °C (98.6 °F) 12/03/24 1136   Pulse 52 12/03/24 1149   Resp 12 12/03/24 1149   SpO2 95 % 12/03/24 1149   Vitals shown include unfiled device data.      No case tracking events are documented in the log.      Pain/Toshia Score: No data recorded

## 2024-12-03 NOTE — TRANSFER OF CARE
Anesthesia Transfer of Care Note    Patient: Alexander Crawford    Procedure(s) Performed: Procedure(s) (LRB):  CYSTOSCOPY, WITH BLADDER BIOPSY, WITH FULGURATION IF INDICATED (Bilateral)  PYELOGRAM, RETROGRADE (Bilateral)    Patient location: PACU    Anesthesia Type: general    Transport from OR: Transported from OR on room air with adequate spontaneous ventilation    Post pain: adequate analgesia    Post assessment: no apparent anesthetic complications    Post vital signs: stable    Level of consciousness: alert    Nausea/Vomiting: no nausea/vomiting    Complications: none    Transfer of care protocol was followed      Last vitals: Visit Vitals  BP (!) 149/75 (BP Location: Right arm, Patient Position: Sitting)   Pulse 60   Temp 36.4 °C (97.5 °F)   Resp 18   Wt 84.5 kg (186 lb 4.6 oz)   SpO2 97%   BMI 24.58 kg/m²

## 2024-12-09 ENCOUNTER — OFFICE VISIT (OUTPATIENT)
Facility: CLINIC | Age: 84
End: 2024-12-09
Payer: MEDICARE

## 2024-12-09 VITALS
DIASTOLIC BLOOD PRESSURE: 80 MMHG | BODY MASS INDEX: 24.58 KG/M2 | RESPIRATION RATE: 16 BRPM | HEART RATE: 71 BPM | SYSTOLIC BLOOD PRESSURE: 152 MMHG | WEIGHT: 186.31 LBS

## 2024-12-09 DIAGNOSIS — N32.81 OVERACTIVE BLADDER: ICD-10-CM

## 2024-12-09 DIAGNOSIS — R35.1 NOCTURIA: ICD-10-CM

## 2024-12-09 DIAGNOSIS — R82.89 ABNORMAL URINE CYTOLOGY: ICD-10-CM

## 2024-12-09 DIAGNOSIS — C67.8 MALIGNANT NEOPLASM OF OVERLAPPING SITES OF BLADDER: Primary | ICD-10-CM

## 2024-12-09 DIAGNOSIS — R31.29 OTHER MICROSCOPIC HEMATURIA: ICD-10-CM

## 2024-12-09 LAB
COMMENT: NORMAL
FINAL PATHOLOGIC DIAGNOSIS: NORMAL
GROSS: NORMAL
Lab: NORMAL
MICROSCOPIC EXAM: NORMAL

## 2024-12-09 PROCEDURE — 1159F MED LIST DOCD IN RCRD: CPT | Mod: CPTII,S$GLB,, | Performed by: UROLOGY

## 2024-12-09 PROCEDURE — 1126F AMNT PAIN NOTED NONE PRSNT: CPT | Mod: CPTII,S$GLB,, | Performed by: UROLOGY

## 2024-12-09 PROCEDURE — 3077F SYST BP >= 140 MM HG: CPT | Mod: CPTII,S$GLB,, | Performed by: UROLOGY

## 2024-12-09 PROCEDURE — 1101F PT FALLS ASSESS-DOCD LE1/YR: CPT | Mod: CPTII,S$GLB,, | Performed by: UROLOGY

## 2024-12-09 PROCEDURE — 99214 OFFICE O/P EST MOD 30 MIN: CPT | Mod: S$GLB,,, | Performed by: UROLOGY

## 2024-12-09 PROCEDURE — 3079F DIAST BP 80-89 MM HG: CPT | Mod: CPTII,S$GLB,, | Performed by: UROLOGY

## 2024-12-09 PROCEDURE — 99999 PR PBB SHADOW E&M-EST. PATIENT-LVL III: CPT | Mod: PBBFAC,,, | Performed by: UROLOGY

## 2024-12-09 PROCEDURE — 3288F FALL RISK ASSESSMENT DOCD: CPT | Mod: CPTII,S$GLB,, | Performed by: UROLOGY

## 2024-12-09 NOTE — PROGRESS NOTES
Subjective:       Patient ID: Alexander Crawford is a 84 y.o. male.    Chief Complaint: Post-op Evaluation (/)      History of Present Illness:     Mr Crawford has recurrent bladder cancer.  His stacy catheter is in place draining yellow colored urine.  No gross hematuria.  He has persistent microscopic hematuria.  He had nocturia, urgency, and urinary frequency prior to his recent surgery.  Oxybutynin 5 mg ER 1 PO qhs was increased to 10 mg ER 1 PO qhs on 11-13-24.      He is s/p cystoscopy with bladder biopsies with fulguration of 3 small flat red bladder lesions (2 located at the posterior bladder wall and 1 located at the left dome of the bladder)  with normal bilateral retrograde pyelograms by me on 12-3-24.  Path:  Pending.    Urine cytology on 10-16-24 showed atypical urothelial cells.  Urine urovysion on 10-16-24 was suspicious.    He had bladder cancer in multiple sites in his bladder s/p transurethral resection of several bladder tumors, bilateral retrograde pyelograms, and right ureteral stent placement on 5-3-24.  Path of his midline posterior wall, left posterior wall, two dome tumors, and trigone near the right ureteral orifice all showed high grade papillary urothelial carcinoma, no definitive evidence of invasion, and muscularis propria is present and uninvolved by tumor.  His right ureteral stent was removed cystoscopically on 5-24-24.      He underwent 6 weeks of induction BCG treatments starting on 12-6-23.    He underwent a second round of 6 weeks of BCG treatments from 6-6-24 to 8-19-24.    He has a history of a TURBT and bladder biopsies with fulguration of 5 bladder tumors, normal bilateral RGPs, and right ureteral stent placement on 11-8-23.  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  Other biopsy locations showed high grade papillary urothelial carcinoma non invasive.      He has a  history of bladder biopsies on 8-29-23 by Dr Moe Henning, Urologist in [place].  Path showed high grade urothelial carcinoma with no definitive invasion.           Past Medical History:   Diagnosis Date    Bladder cancer     CAD (coronary artery disease)     CVA (cerebral vascular accident)     Diabetes mellitus     Diabetes mellitus, type 2     HLD (hyperlipidemia)     Hypertension     Myocardial infarction      Family History   Problem Relation Name Age of Onset    Lung cancer Father       Social History     Socioeconomic History    Marital status:    Tobacco Use    Smoking status: Former     Types: Cigarettes    Smokeless tobacco: Never    Tobacco comments:     Quit 30 yrs ago.    Substance and Sexual Activity    Alcohol use: Not Currently     Comment: Ocassionally drinks beer.    Drug use: Never     Social Drivers of Health     Financial Resource Strain: Low Risk  (10/31/2023)    Received from [employer] [facility] and Its Subsidiaries and Affiliates, [employer] [facility]    Overall Financial Resource Strain (CARDIA)     Difficulty of Paying Living Expenses: Not very hard   Food Insecurity: No Food Insecurity (10/31/2023)    Received from [employer] [facility] and Its Subsidiaries and Affiliates, [employer] [facility] and Its Subsidiaries and Affiliates    Hunger Vital Sign     Worried About Running Out of Food in the Last Year: Never true     Ran Out of Food in the Last Year: Never true   Transportation Needs: No Transportation Needs (10/31/2023)    [facility], [employer] [facility] and Its Subsidiaries and Affiliates    PRAPARE - Transportation     Lack of Transportation (Medical): No     Lack of Transportation (Non-Medical): No    Physical Activity: Insufficiently Active (10/31/2023)    Received from [facility] and Its Subsidiaries and Affiliates, [facility]    Exercise Vital Sign     Days of Exercise per Week: 3 days     Minutes of Exercise per Session: 30 min   Housing Stability: Unknown (10/31/2023)    Received from [facility] and Its Subsidiaries and Affiliates, [facility] and Its Subsidiaries and Affiliates    Housing Stability Vital Sign     Number of Places Lived in the Last Year: 1     Outpatient Encounter Medications as of 12/9/2024   Medication Sig Dispense Refill    aspirin (ECOTRIN) 81 MG EC tablet Take 1 tablet by mouth every morning.      clopidogreL (PLAVIX) 75 mg tablet Take 1 tablet by mouth every morning.      gabapentin (NEURONTIN) 300 MG capsule Take 300 mg by mouth Daily.      glipiZIDE (GLUCOTROL) 5 MG tablet Take 5 mg by mouth 2 (two) times daily.      HYDROcodone-acetaminophen (NORCO) 5-325 mg per tablet Take 1 tablet by mouth every 6 (six) hours as needed for Pain. 25 tablet 0    hyoscyamine (LEVSIN) 0.125 mg Subl Place 1 tablet (0.125 mg total) under the tongue every 6 (six) hours as needed (Take 1 under the tongue every 6 hours as needed for bladder spasms). 30 tablet 1    hyoscyamine 0.125 mg Subl Place 1 tablet (0.125 mg total) under the tongue every 6 (six) hours as needed (Bladder spasms). 30 tablet 0    isosorbide mononitrate (IMDUR) 120 MG 24 hr tablet Take 120 mg by mouth once daily.      losartan-hydrochlorothiazide 50-12.5 mg (HYZAAR) 50-12.5 mg per tablet Take 1 tablet by mouth once daily.      metFORMIN (GLUCOPHAGE) 1000 MG tablet Take 1,000 mg by mouth 2 (two) times daily.      oxybutynin (DITROPAN-XL) 10 MG 24 hr tablet Take 1 tablet (10 mg total) by mouth nightly. 90 tablet 3    oxybutynin (DITROPAN-XL) 5 MG  TR24 Take 1 tablet (5 mg total) by mouth nightly. 90 tablet 1    simvastatin (ZOCOR) 20 MG tablet Take 20 mg by mouth.      sulfamethoxazole-trimethoprim 800-160mg (BACTRIM DS) 800-160 mg Tab Take 1 tablet by mouth 2 (two) times daily. 20 tablet 0     Facility-Administered Encounter Medications as of 12/9/2024   Medication Dose Route Frequency Provider Last Rate Last Admin    BCG live (PILO) 50 mg in sodium chloride 0.9% 50 mL bladder instillation  50 mg Intravesical See admin instructions             Review of Systems   Constitutional:  Negative for chills and fever.   Respiratory:  Negative for shortness of breath.    Cardiovascular:  Negative for chest pain.   Gastrointestinal:  Negative for nausea and vomiting.   Genitourinary:  Negative for hematuria.   Musculoskeletal:  Negative for back pain.   Skin:  Negative for rash.   Neurological:  Negative for dizziness.   Psychiatric/Behavioral:  Negative for agitation.        Objective:     BP (!) 152/80 (Patient Position: Sitting)   Pulse 71   Resp 16   Wt 84.5 kg (186 lb 4.6 oz)   BMI 24.58 kg/m²     Physical Exam  Constitutional:       Appearance: Normal appearance.   Pulmonary:      Effort: Pulmonary effort is normal.   Abdominal:      Palpations: Abdomen is soft.   Genitourinary:     Comments: Urethral stacy catheter is in place draining yellow colored urine.  Neurological:      Mental Status: He is alert and oriented to person, place, and time.   Psychiatric:         Mood and Affect: Mood normal.         No visits with results within 1 Day(s) from this visit.   Latest known visit with results is:   Admission on 12/03/2024, Discharged on 12/03/2024   Component Date Value Ref Range Status    POCT Glucose 12/03/2024 240 (H)  70 - 110 mg/dL Final        Results for orders placed or performed in visit on 04/08/24 (from the past 8760 hours)   POCT Bladder Scan   Result Value    POC Residual Urine Volume 146 (A)        Assessment:       1. Malignant neoplasm of  overlapping sites of bladder    2. Nocturia    3. Overactive bladder    4. Other microscopic hematuria    5. Abnormal urine cytology        Plan:             10-14-24  CT urogram.  See report.  Kidneys/ Ureters: Punctate nonobstructing right renal calculus.  Too small to characterize hypodensities in both kidneys statistically representing cysts.  No hydronephrosis.  No hydroureter.  Both ureters are well opacified; no urothelial lesion in the bilateral renal pelves or ureters.  Bladder: Irregular bladder wall thickening posteriorly, more pronounced around the right ureteral vesicular junction.  There is also some bladder wall thickening along the dome of the bladder and anteriorly.  Reproductive organs: Unremarkable.  There are a few pulmonary nodules bilaterally such as the 6 x 4 mm nodule in the inferior right upper lobe. Additional index pulmonary nodule in the right upper lobe measures 8 mm x 6 mm.  Small gallstones.  Significant lumbar degenerative changes most pronounced at the L2-L3 level where there is at least moderate central canal stenosis.      11-22-23  Renal ultrasound.  No hydronephrosis or renal stone is seen bilaterally.  9 mm, 7 mm, and 6 mm adjacent simple appearing left renal cysts.  No right renal lesion is seen.     I reviewed his above imaging result.            10-2-24  PSA 1.4     10-13-23  PSA 0.99     10-2-24  Cr 1.6.  GFR 42.5.  BUN 20.     4-24-24  Cr 1.1.  GFR >60.     10-16-24  Urine cytology.  Atypical urothelial cells.     10-16-24  Urine urovysion.  Suspicious.     I reviewed his above lab result.            Assessment:  - Bladder cancer.  - S/p cystoscopy with bladder biopsies with fulguration of 3 small flat red bladder lesions (2 located at the posterior bladder wall and 1 located at the left dome of the bladder)  with normal bilateral retrograde pyelograms by me on 12-3-24.  Path:  Pending.  - Urine cytology on 10-16-24 showed atypical urothelial cells.  Urine urovysion on  10-16-24 was suspicious.  - CT urogram on 10-14-24 showed:  Kidneys/ Ureters: Punctate nonobstructing right renal calculus.  Too small to characterize hypodensities in both kidneys statistically representing cysts.  No hydronephrosis.  No hydroureter.  Both ureters are well opacified; no urothelial lesion in the bilateral renal pelves or ureters.  Bladder: Irregular bladder wall thickening posteriorly, more pronounced around the right ureteral vesicular junction.  There is also some bladder wall thickening along the dome of the bladder and anteriorly.  Reproductive organs: Unremarkable.  There are a few pulmonary nodules bilaterally such as the 6 x 4 mm nodule in the inferior right upper lobe. Additional index pulmonary nodule in the right upper lobe measures 8 mm x 6 mm.    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  Path of his midline posterior wall, left posterior wall, two dome tumors, and trigone near the right ureteral orifice all showed high grade papillary urothelial carcinoma, no definitive evidence of invasion, and muscularis propria is present and uninvolved by tumor.  His right ureteral stent was removed cystoscopically on 5-24-24.    - He underwent 6 weeks of induction BCG treatments starting on 12-6-23.  - He underwent a second round of 6 weeks of BCG treatments from 6-6-24 to 8-19-24.  - History of a TURBT and bladder biopsies with fulguration of 5 bladder tumors, normal bilateral RGPs, and right ureteral stent placement on 11-8-23.  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  Other biopsy locations showed high grade papillary urothelial carcinoma non invasive.    - History of bladder biopsies on 8-29-23 by Dr Moe Henning, Urologist in  [place].  Path showed high grade urothelial carcinoma with no definitive invasion.  - 20 pound unintentional weight loss this year per the patient.  - Persistent microscopic hematuria.    - Nocturia and OAB.  He was started on oxybutynin 5 mg ER 1 PO qhs on 10-16-24 without improvement in his symptoms.  Oxybutynin 5 mg ER 1 PO qhs was increased to 10 mg ER 1 PO qhs on 11-13-24.    - Mild BPH without obstructive LUTS.  - He is a former smoker.  - CAD.  History of an MI.  He takes aspirin 81 mg and plavix.  Dr Ty Suh is his Cardiologist.     Plan:  - Pathology report from 12-3-24 is currently pending.  Will plan to call the patient when his pathology report returns.  - His stacy catheter was removed today on 12-9-24.  - Continue oxybutynin 10 mg ER 1 PO qhs.  - I discussed dietary modifications with him today and I recommended he drink mostly water during the day.  - I recommended he limit his fluid intake at night to small amounts of water and to void just prior to bedtime.   - He has a CT chest without IV contrast is scheduled for 1-22-25.  - RTC in 4 months for a surveillance cystoscopy or sooner depending on his pathology report result.

## 2024-12-11 ENCOUNTER — TELEPHONE (OUTPATIENT)
Dept: UROLOGY | Facility: CLINIC | Age: 84
End: 2024-12-11
Payer: MEDICARE

## 2024-12-11 NOTE — TELEPHONE ENCOUNTER
----- Message from Raoul Rachel MD sent at 12/10/2024 12:16 PM CST -----  I called Mr Crawford 3 times and was unable to reach him and I left him a voice message.  Please call Mr Crawford to make sure he got my voice message that said that I reviewed his pathology report and it did not show cancer in any of the bladder biopsies which is great.  Make sure he knows the details of his CT chest in January 2025 and his cystoscopy in April 2025.  Ask him if he has any questions.   Division sternocleidal mastoid open neck procedure

## 2025-03-08 NOTE — PROGRESS NOTES
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  Urine in clinic is negative and  patient denies gross hematuria.   Allergies:  Patient has no known allergies.     Medications:  has a current medication list which includes the following prescription(s): aspirin, clopidogrel, gabapentin, glipizide, hyoscyamine, isosorbide mononitrate, losartan-hydrochlorothiazide 50-12.5 mg, metformin, and simvastatin, and the following Facility-Administered Medications: [START ON 6/7/2024] BCG live (PILO) 50 mg in sodium chloride 0.9% 50 mL bladder instillation.     Review of Systems:  General: No fever, chills, fatigability, or weight loss.  Skin: No rashes, itching, or changes in color or texture of skin.  Chest: Denies ARDON, cyanosis, wheezing, cough, and sputum production.  Abdomen: Appetite fine. No weight loss. Denies diarrhea, abdominal pain, hematemesis, or blood in stool.  Musculoskeletal: No joint stiffness or swelling. Denies back pain.  : As above.  All other review of systems negative.     PMH:   has a past medical history of Bladder cancer, CAD (coronary artery disease), CVA (cerebral vascular accident), Diabetes mellitus, HLD (hyperlipidemia), Hypertension, and Myocardial infarction.     PSH:   has a past surgical history that includes Back surgery; Coronary angioplasty; Carotid endarterectomy (Bilateral); Cystoscopy; turbt (transurethral resection of bladder tumor); turbt (transurethral resection of bladder tumor) (Bilateral, 5/3/2024); Cystoscopy w/ ureteral stent placement (Right, 5/3/2024); and Retrograde pyelography (Bilateral, 5/3/2024).     FamHx: family history includes Lung cancer in his father.     SocHx:  reports that he has quit smoking. His smoking use included cigarettes. He has never used smokeless tobacco. He  reports that he does not currently use alcohol. He reports that he does not use drugs.       Physical Exam:   General: A&Ox3, no apparent distress, no deformities  Neck: No masses, normal thyroid  Lungs: normal inspiration, no use of accessory muscles  Heart: normal pulse, no arrhythmias  Abdomen: Soft, NT, ND, no masses, no hernias, no hepatosplenomegaly  Lymphatic: Neck and groin nodes negative  Skin: The skin is warm and dry. No jaundice.     Labs/Studies:     See HPI     Impression/Plan:   BCG treatment   See nursing note   no abdominal pain, no bloating, no constipation, no diarrhea, no nausea and no vomiting.

## 2025-04-09 ENCOUNTER — HOSPITAL ENCOUNTER (OUTPATIENT)
Dept: CARDIOLOGY | Facility: HOSPITAL | Age: 85
Discharge: HOME | End: 2025-04-09
Attending: UROLOGY
Payer: MEDICARE

## 2025-04-09 ENCOUNTER — PROCEDURE VISIT (OUTPATIENT)
Dept: UROLOGY | Facility: CLINIC | Age: 85
End: 2025-04-09
Payer: MEDICARE

## 2025-04-09 VITALS
HEIGHT: 73 IN | WEIGHT: 186.31 LBS | BODY MASS INDEX: 24.69 KG/M2 | DIASTOLIC BLOOD PRESSURE: 74 MMHG | SYSTOLIC BLOOD PRESSURE: 167 MMHG | HEART RATE: 71 BPM

## 2025-04-09 DIAGNOSIS — D49.4 BLADDER NEOPLASM: ICD-10-CM

## 2025-04-09 DIAGNOSIS — Z01.818 PREOP TESTING: ICD-10-CM

## 2025-04-09 DIAGNOSIS — R91.8 PULMONARY NODULES: ICD-10-CM

## 2025-04-09 DIAGNOSIS — Z01.818 PRE-OP TESTING: ICD-10-CM

## 2025-04-09 DIAGNOSIS — R35.1 NOCTURIA: ICD-10-CM

## 2025-04-09 DIAGNOSIS — Z85.51 HISTORY OF BLADDER CANCER: Primary | ICD-10-CM

## 2025-04-09 DIAGNOSIS — N32.81 OVERACTIVE BLADDER: ICD-10-CM

## 2025-04-09 LAB
BILIRUB UR QL STRIP: NEGATIVE
GLUCOSE UR QL STRIP: POSITIVE
KETONES UR QL STRIP: NEGATIVE
LEUKOCYTE ESTERASE UR QL STRIP: NEGATIVE
OHS QRS DURATION: 146 MS
OHS QTC CALCULATION: 427 MS
PH, POC UA: 5.5
POC BLOOD, URINE: NEGATIVE
POC NITRATES, URINE: NEGATIVE
PROT UR QL STRIP: POSITIVE
SP GR UR STRIP: 1.02 (ref 1–1.03)
UROBILINOGEN UR STRIP-ACNC: 0.2 (ref 0.3–2.2)

## 2025-04-09 PROCEDURE — 93010 ELECTROCARDIOGRAM REPORT: CPT | Mod: ,,, | Performed by: INTERNAL MEDICINE

## 2025-04-09 PROCEDURE — 93005 ELECTROCARDIOGRAM TRACING: CPT

## 2025-04-09 RX ORDER — SOLIFENACIN SUCCINATE 10 MG/1
10 TABLET, FILM COATED ORAL NIGHTLY
Qty: 90 TABLET | Refills: 1 | Status: SHIPPED | OUTPATIENT
Start: 2025-04-09 | End: 2026-04-09

## 2025-04-09 NOTE — PROCEDURES
Cystoscopy    Date/Time: 4/9/2025 2:00 PM    Performed by: Raoul Rachel MD  Authorized by: Raoul Rachel MD    Consent Done?:  Yes (Verbal) and Yes (Written)  Timeout: prior to procedure the correct patient, procedure, and site was verified    Prep: patient was prepped and draped in usual sterile fashion    Local anesthesia used?: Yes    Anesthesia:  Lidocaine jelly  Indications: history bladder cancer    Position:  Supine  Anesthesia:  Lidocaine jelly  Preparation: Patient was prepped and draped in usual sterile fashion    Scope type:  Flexible cystoscope   patient tolerated the procedure well with no immediate complications  Comments:      The flexible cystoscope was advanced through his urethra into his bladder.  The bladder was inspected in a systematic fashion.  His bilateral ureteral orifices are orthotopic and patent with clear efflux of urine.  There is a flat red approximately 2 cm bladder lesion at the right posterior bladder wall just behind the trigone.  There is another similar appearing flat red approximately 2 cm bladder lesion at the right posterior bladder that is closer towards the dome of his bladder.  No bladder stone and no foreign body is seen.  Moderate bladder trabeculations.  Mildly enlarged lateral lobes of his prostate.  There is no median lobe of his prostate.  A bladder wash urine cytology was obtained.  The cystoscope was removed from his bladder.  The patient tolerated this procedure well.          10-14-24  CT urogram.  See report.  Kidneys/ Ureters: Punctate nonobstructing right renal calculus.  Too small to characterize hypodensities in both kidneys statistically representing cysts.  No hydronephrosis.  No hydroureter.  Both ureters are well opacified; no urothelial lesion in the bilateral renal pelves or ureters.  Bladder: Irregular bladder wall thickening posteriorly, more pronounced around the right ureteral vesicular junction.  There is also some bladder wall thickening  along the dome of the bladder and anteriorly.  Reproductive organs: Unremarkable.  There are a few pulmonary nodules bilaterally such as the 6 x 4 mm nodule in the inferior right upper lobe. Additional index pulmonary nodule in the right upper lobe measures 8 mm x 6 mm.  Small gallstones.  Significant lumbar degenerative changes most pronounced at the L2-L3 level where there is at least moderate central canal stenosis.      11-22-23  Renal ultrasound.  No hydronephrosis or renal stone is seen bilaterally.  9 mm, 7 mm, and 6 mm adjacent simple appearing left renal cysts.  No right renal lesion is seen.     I reviewed his above imaging result.            10-2-24  PSA 1.4     10-13-23  PSA 0.99     10-2-24  Cr 1.6.  GFR 42.5.  BUN 20.     4-24-24  Cr 1.1.  GFR >60.     10-16-24  Urine cytology.  Atypical urothelial cells.     10-16-24  Urine urovysion.  Suspicious.     I reviewed his above lab result.            Assessment:  - Bladder cancer.  - Cystoscopy today on 4-9-25 showed a flat red approximately 2 cm bladder lesion at the right posterior bladder wall just behind the trigone.  There is another similar appearing flat red approximately 2 cm bladder lesion at the right posterior bladder that is closer towards the dome of his bladder.  Moderate bladder trabeculations.  Mild BPH.  - History of a cystoscopy with bladder biopsies with fulguration of 3 small flat red bladder lesions (2 located at the posterior bladder wall and 1 located at the left dome of the bladder) with normal bilateral retrograde pyelograms by me on 12-3-24.  Path:  Chronic cystitis.  Negative for malignancy.  - Urine cytology on 10-16-24 showed atypical urothelial cells.  Urine urovysion on 10-16-24 was suspicious.  - CT urogram on 10-14-24 showed:  Kidneys/ Ureters: Punctate nonobstructing right renal calculus.  Too small to characterize hypodensities in both kidneys statistically representing cysts.  No hydronephrosis.  No hydroureter.  Both  ureters are well opacified; no urothelial lesion in the bilateral renal pelves or ureters.  Bladder: Irregular bladder wall thickening posteriorly, more pronounced around the right ureteral vesicular junction.  There is also some bladder wall thickening along the dome of the bladder and anteriorly.  Reproductive organs: Unremarkable.  There are a few pulmonary nodules bilaterally such as the 6 x 4 mm nodule in the inferior right upper lobe. Additional index pulmonary nodule in the right upper lobe measures 8 mm x 6 mm.    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  Path of his midline posterior wall, left posterior wall, two dome tumors, and trigone near the right ureteral orifice all showed high grade papillary urothelial carcinoma, no definitive evidence of invasion, and muscularis propria is present and uninvolved by tumor.  His right ureteral stent was removed cystoscopically on 5-24-24.    - He underwent 6 weeks of induction BCG treatments starting on 12-6-23.  - He underwent a second round of 6 weeks of BCG treatments from 6-6-24 to 8-19-24.  - History of a TURBT and bladder biopsies with fulguration of 5 bladder tumors, normal bilateral RGPs, and right ureteral stent placement on 11-8-23.  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  Other biopsy locations showed high grade papillary urothelial carcinoma non invasive.    - History of bladder biopsies on 8-29-23 by Dr Moe Henning, Urologist in [place].  Path showed high grade urothelial carcinoma with no definitive invasion.  - 20 pound unintentional weight loss this year per the patient.  - Persistent microscopic hematuria.    - Nocturia and OAB.  He was started on oxybutynin 5 mg ER 1 PO qhs on  10-16-24 without improvement in his symptoms.  Oxybutynin 5 mg ER 1 PO qhs was increased to 10 mg ER 1 PO qhs on 11-13-24.  He says the oxybutynin 10 mg ER was not effective and he is no longer taking it.  - Mild BPH without obstructive LUTS.  - He is a former smoker.  - CAD.  History of an MI.  He takes aspirin 81 mg and plavix.  Dr Ty Suh is his Cardiologist.     Plan:  - I discussed options with the patient today after his cystoscopy and the mutual decision was made to proceed to the OR for cystoscopy with bladder biopsies with fulguration, possible transurethral resection of bladder tumors, bilateral retrograde pyelograms, possible bilateral ureteral stents placement, and any other indicated procedures.  Risks and benefits of the surgery were explained to the patient and the patient expressed understanding.  All questions were answered by me to the patient's apparent understanding and to the patient's apparent satisfaction.  Surgery consent was signed today by the patient.   - Referral to his Cardiologist, Dr Ty Suh, for preop cardiac clearance and for clearance to stop blood thinners prior to his surgery that is scheduled for 4-29-25.  - Bladder wash urine cytology today.  - Started solifenacin 10 mg ER 1 PO qhs today on 4-9-25.  - I discussed dietary modifications with him today and I recommended he drink mostly water during the day.  - I recommended he limit his fluid intake at night to small amounts of water and to void just prior to bedtime.   - He has a CT chest without IV contrast on 4-14-25.

## 2025-04-10 ENCOUNTER — TELEPHONE (OUTPATIENT)
Dept: UROLOGY | Facility: CLINIC | Age: 85
End: 2025-04-10
Payer: MEDICARE

## 2025-04-10 ENCOUNTER — RESULTS FOLLOW-UP (OUTPATIENT)
Dept: UROLOGY | Facility: CLINIC | Age: 85
End: 2025-04-10

## 2025-04-10 NOTE — TELEPHONE ENCOUNTER
Patient was informed of results.         ----- Message from Raoul Rachel MD sent at 4/10/2025  9:42 AM CDT -----  Please call Mr Crawford and let him know that I reviewed his preop labs and his hemoglobin and hematocrit are mildly low indicating he is mildly anemic but these labs are fairly stable compared to his   lab results from 4 months ago.  I recommend he follow up with his PCP about his mild anemia.  Make sure he knows the details of his upcoming CT chest.  Let me know that his surgery is scheduled for   Tuesday 4- at [facility] at 10 am and he will need to be there at 8:30 am and nothing to eat or drink for at least 8 hours prior to his surgery.  Let him know that sometimes surgery times   change and the hospital will call him for a surgery time change.  Make sure that he gets cleared by his Cardiologist to stop his blood thinners prior to his surgery.  Please follow up on his cardiac   clearance.  Ask him if he has any questions.  ----- Message -----  From: Lab, Background User  Sent: 4/9/2025  10:54 PM CDT  To: Raoul Rachel MD

## 2025-04-11 ENCOUNTER — TELEPHONE (OUTPATIENT)
Dept: UROLOGY | Facility: CLINIC | Age: 85
End: 2025-04-11
Payer: MEDICARE

## 2025-04-11 NOTE — TELEPHONE ENCOUNTER
I called pt and informed him of Dr. Rachel's message below.  Pt verbalized understanding, however he is requesting to have his surgery time moved to later in the afternoon as he has to drive 100 miles to get to [place]; told pt I would check into that.  Also informed pt that I sent a fax to his cardiologist on yesterday in hopes to get clearance and recommendations on holding his anticoags.  Analia Rose LPN    ----- Message from Raoul Rachel MD sent at 4/10/2025  9:42 AM CDT -----  Please call Mr Crawford and let him know that I reviewed his preop labs and his hemoglobin and hematocrit are mildly low indicating he is mildly anemic but these labs are fairly stable compared to his   lab results from 4 months ago.  I recommend he follow up with his PCP about his mild anemia.  Make sure he knows the details of his upcoming CT chest.  Let me know that his surgery is scheduled for   Tuesday 4- at [facility] at 10 am and he will need to be there at 8:30 am and nothing to eat or drink for at least 8 hours prior to his surgery.  Let him know that sometimes surgery times   change and the hospital will call him for a surgery time change.  Make sure that he gets cleared by his Cardiologist to stop his blood thinners prior to his surgery.  Please follow up on his cardiac   clearance.  Ask him if he has any questions.  ----- Message -----  From: Lab, Background User  Sent: 4/9/2025  10:54 PM CDT  To: Raoul Rachel MD

## 2025-04-11 NOTE — TELEPHONE ENCOUNTER
----- Message from Raoul Rachel MD sent at 4/10/2025  9:42 AM CDT -----  Please call Mr Crawford and let him know that I reviewed his preop labs and his hemoglobin and hematocrit are mildly low indicating he is mildly anemic but these labs are fairly stable compared to his   lab results from 4 months ago.  I recommend he follow up with his PCP about his mild anemia.  Make sure he knows the details of his upcoming CT chest.  Let me know that his surgery is scheduled for   Tuesday 4- at [facility] at 10 am and he will need to be there at 8:30 am and nothing to eat or drink for at least 8 hours prior to his surgery.  Let him know that sometimes surgery times   change and the hospital will call him for a surgery time change.  Make sure that he gets cleared by his Cardiologist to stop his blood thinners prior to his surgery.  Please follow up on his cardiac   clearance.  Ask him if he has any questions.  ----- Message -----  From: Lab, Background User  Sent: 4/9/2025  10:54 PM CDT  To: Raoul Rachel MD

## 2025-04-14 ENCOUNTER — TELEPHONE (OUTPATIENT)
Dept: UROLOGY | Facility: CLINIC | Age: 85
End: 2025-04-14
Payer: MEDICARE

## 2025-04-14 NOTE — TELEPHONE ENCOUNTER
Called and spoke to pt; informed him that I received cardiac clearance from Dr. Suh's office; instructed pt to hold his aspirin and plavix for 5 days prior to his procedure; pt verbalized understanding.

## 2025-04-21 ENCOUNTER — TELEPHONE (OUTPATIENT)
Dept: UROLOGY | Facility: CLINIC | Age: 85
End: 2025-04-21
Payer: MEDICARE

## 2025-04-21 NOTE — TELEPHONE ENCOUNTER
.Outgoing call attempted to notify patient regarding below but no answer and no voice mail picked up to leave a message and no MyChart available.    ----- Message from Marisabel sent at 4/21/2025  4:48 PM CDT -----  Contact: Alexander Crockett is calling in regards to cancelling his appt on 04/29 and will call back to get rescheduled.Thanks MISTI

## 2025-04-22 ENCOUNTER — TELEPHONE (OUTPATIENT)
Dept: UROLOGY | Facility: CLINIC | Age: 85
End: 2025-04-22
Payer: MEDICARE

## 2025-04-22 NOTE — TELEPHONE ENCOUNTER
.Outgoing call attempted to confirm patient wanted to cancel his surgery but no answer, unable to leave a voice message as no voicemail came on and unable to send a [facility] message.

## 2025-04-24 ENCOUNTER — ANESTHESIA EVENT (OUTPATIENT)
Dept: SURGERY | Facility: HOSPITAL | Age: 85
End: 2025-04-24
Payer: MEDICARE

## 2025-04-24 NOTE — ANESTHESIA PREPROCEDURE EVALUATION
04/24/2025  Alexander Crawford is a 84 y.o., male.    Patient Active Problem List   Diagnosis    Malignant neoplasm of overlapping sites of bladder    Diabetes mellitus, type 2    CAD (coronary artery disease)    CVA (cerebral vascular accident)    Hypertension    Hyperlipidemia    Anemia     Past Surgical History:   Procedure Laterality Date    BACK SURGERY      3 back surgeries    CAROTID ENDARTERECTOMY Bilateral     CORONARY ANGIOPLASTY      CYSTOSCOPY      CYSTOSCOPY W/ URETERAL STENT PLACEMENT Right 5/3/2024    Procedure: CYSTOSCOPY, WITH URETERAL STENT INSERTION;  Surgeon: Raoul Rachel MD;  Location: [facility] OR;  Service: Urology;  Laterality: Right;    CYSTOSCOPY WITH BIOPSY OF BLADDER Bilateral 12/3/2024    Procedure: CYSTOSCOPY, WITH BLADDER BIOPSY, WITH FULGURATION IF INDICATED;  Surgeon: Raoul Rachel MD;  Location: [facility] OR;  Service: Urology;  Laterality: Bilateral;    RETROGRADE PYELOGRAPHY Bilateral 5/3/2024    Procedure: PYELOGRAM, RETROGRADE;  Surgeon: Raoul Rachel MD;  Location: [facility] OR;  Service: Urology;  Laterality: Bilateral;    RETROGRADE PYELOGRAPHY Bilateral 12/3/2024    Procedure: PYELOGRAM, RETROGRADE;  Surgeon: Raoul Rachel MD;  Location: [facility] OR;  Service: Urology;  Laterality: Bilateral;    TURBT (TRANSURETHRAL RESECTION OF BLADDER TUMOR)      x2    TURBT (TRANSURETHRAL RESECTION OF BLADDER TUMOR) Bilateral 5/3/2024    Procedure: TURBT (TRANSURETHRAL RESECTION OF BLADDER TUMOR);  Surgeon: Raoul Rachel MD;  Location: [facility] OR;  Service: Urology;  Laterality: Bilateral;       Pre-op Assessment    I have reviewed the Patient Summary Reports.    I have reviewed the NPO Status.   I have reviewed the Medications.     Review of Systems  Anesthesia Hx:  No problems with previous Anesthesia   History of prior surgery of interest to airway management or planning:  Previous  anesthesia: General        Denies Family Hx of Anesthesia complications.    Denies Personal Hx of Anesthesia complications.                    Social:  Non-Smoker, Former Smoker       Hematology/Oncology:  Hematology Normal                  Hematology Comments: Chronic AC                    Cardiovascular:     Hypertension  Past MI CAD           hyperlipidemia   ECG has been reviewed. Pt has had balloon angioplasty approx 3 years ago.  Occasional chest pain treated with isosorbide.  This has been stable.  Saw his cardiologist recently.                           Pulmonary:  Pulmonary Normal                       Renal/:  Renal/ Normal                 Hepatic/GI:  Hepatic/GI Normal                    Neurological:   CVA                                    Endocrine:  Diabetes               Physical Exam  General: Alert and Oriented    Airway:  Mallampati: III   Mouth Opening: Normal  TM Distance: Normal  Tongue: Normal  Neck ROM: Normal ROM    Dental:  Intact    Chest/Lungs:  Clear to auscultation, Normal Respiratory Rate    Heart:  Rate: Normal  Rhythm: Regular Rhythm        Anesthesia Plan  Type of Anesthesia, risks & benefits discussed:    Anesthesia Type: Gen ETT, Gen Supraglottic Airway  Intra-op Monitoring Plan: Standard ASA Monitors  Post Op Pain Control Plan: multimodal analgesia  Induction:  IV  Airway Plan: , Post-Induction  Informed Consent: Informed consent signed with the Patient and all parties understand the risks and agree with anesthesia plan.  All questions answered.   ASA Score: 3  Day of Surgery Review of History & Physical: H&P Update referred to the surgeon/provider.    Ready For Surgery From Anesthesia Perspective.     .      Chemistry        Component Value Date/Time     04/09/2025 1547     11/13/2024 1454    K 4.1 04/09/2025 1547    K 5.1 11/13/2024 1454     04/09/2025 1547     11/13/2024 1454    CO2 28 04/09/2025 1547    CO2 21 (L) 11/13/2024 1454    BUN 20  04/09/2025 1547    CREATININE 1.2 04/09/2025 1547     (H) 11/13/2024 1454        Component Value Date/Time    CALCIUM 9.0 04/09/2025 1547    CALCIUM 9.2 11/13/2024 1454    ALKPHOS 68 04/09/2025 1547    ALKPHOS 64 11/13/2024 1454    AST 12 04/09/2025 1547    AST 12 11/13/2024 1454    ALT 7 (L) 04/09/2025 1547    ALT 7 (L) 11/13/2024 1454    BILITOT 0.4 04/09/2025 1547    BILITOT 0.4 11/13/2024 1454        Lab Results   Component Value Date    WBC 4.38 04/09/2025    HGB 11.4 (L) 04/09/2025    HCT 35.6 (L) 04/09/2025    MCV 99 (H) 04/09/2025     04/09/2025

## 2025-04-28 ENCOUNTER — TELEPHONE (OUTPATIENT)
Dept: UROLOGY | Facility: CLINIC | Age: 85
End: 2025-04-28
Payer: MEDICARE

## 2025-04-28 ENCOUNTER — TELEPHONE (OUTPATIENT)
Dept: PREADMISSION TESTING | Facility: HOSPITAL | Age: 85
End: 2025-04-28
Payer: MEDICARE

## 2025-04-28 NOTE — TELEPHONE ENCOUNTER
Per Dr. Rachel's request, pt was contacted and asked if he would like to proceed with his surgery and be the last patient of the day due to the distance he lives away from [facility]. Pt stated that he would like to proceed with the surgery; Dr. Rachel notified.   Detail Level: Detailed Include Location In Plan?: No Detail Level: Generalized

## 2025-04-28 NOTE — TELEPHONE ENCOUNTER
Called pt and informed him that his surgery that is scheduled for  4/29/25 has been canceled since he did not stop taking his blood thinners.  Told pt that Dr. Rachel could do the surgery on 5/13/25 if pt would show up.  Pt verbalized agreement and stated that he would show up.  Reminded him that he would have to stop the blood thinners 5 days prior.  Pt stated that he would.  Dr. Rachel notified.

## 2025-04-28 NOTE — TELEPHONE ENCOUNTER
To confirm, your doctor has instructed you: Surgery is scheduled for 4/29.   Pre admit office will call the afternoon prior to surgery between 1PM and 3PM with arrival time. Arrival Time 9:30AM    Surgery [facility] at [facility],  The address is [address] [place] [address].      IMPORTANT INSTRUCTIONS!    Do not eat or drink after 12 midnight, including water. Do not smoke or use chewing tobacco after 12 midnight!  OK to brush teeth, but no gum, candy, or mints!      *Take only these medicines with a small swallow of water-morning of surgery*     No meds          ____ Stop taking all vitamins, herbal supplements, Aspirin, & NSAIDS (Ibuprofen, Advil, Aleve) 7 days prior to surgery, as these can thin your blood.    ____ Weight loss medication, such as Adipex and Phentermine, must be stopped 14 days prior to surgery, no exceptions!    *Diabetic Patients: If you take diabetic or weight loss medication, do NOT take morning of surgery unless instructed by Doctor. Metformin to be stopped 24 hrs prior to surgery. DO NOT take short-acting insulin the day of surgery. Only take HALF of your regular dose of long-acting insulin the night before surgery, unless instructed otherwise. Blood sugars will be checked in pre-op.   ~Ozempic/Mounjaro/Wegovy/Trulicity/Semaglutide injections must be stopped 7 days prior to surgery.     Please notify MD office if you develop an active infection, are prescribed antibiotics by someone other than the surgeon doing your surgery, or visit urgent care/ER.    Bathing Instructions:   The night before surgery and the morning prior to coming to the hospital:    - Shower & rinse your body as usual with anti-bacterial Soap (Dial or Lever 2000)   -Hibiclens (if indicated) use AFTER anti-bacterial soap; 1 packet PM/1 packet in AM on surgical site only   -Do not use hibiclens on your head, face, or genitals.    -Do not wash with anti-bacterial soap after you use the hibiclens.     -Do not shave surgical site 5-7 days prior to surgery.    -Pubic hair 7 days prior to surgery (OBGYN/Urology only).       Additional Instructions:   __ No makeup, powder, lotions, creams, or body spray to skin   __ No deodorant if you are having: breast procedure, PORT insertion, or shoulder surgery!   __ No nail polish or artificial nails due to risk of infection.             **SURGERY MAY BE CANCELLED AT SURGEON'S DISCRETION IF ARTIFICIAL/NAIL POLISH IS PRESENT!!!**  __ Please remove all piercings and leave all jewelry at home.    **SURGERY WILL BE CANCELLED IF PIERCINGS ARE PRESENT!!!**    __ Dentures, Hearing Aids and Contact Lens need to be removed prior to the start of surgery.    __ Avoid Alcoholic beverages 3 days prior to surgery, as it can thin the blood, unless told otherwise by pre-admit department.  __ Females: may need to give a urine sample the morning of surgery;   **Please ask  for a specimen cup if you need to use the restroom prior to being called into pre-op.**  __ Males: Stop ED medications (Viagra, Cialis) 24 hrs prior to surgery.  __ You must have transportation, and they MUST stay the entire time.   __  Bring photo ID and insurance information to hospital    What to Wear:  Clean, loose-fitting clothing. Please allow for dressings/bandages/surgical equipment/drains.   ~Breast Patients: We recommend a button up shirt so you do not have to lift your arms.   [employer] Link recommended by breast surgeons if you will have drains in place: https://a.co/d/pHTL6fC  ~Shoulder Patients: We recommend a button up shirt. If unavailable, an oversized t-shirt (2 sizes bigger than your normal size) or a stretchy dress will also work.   [employer] Link for hospital type button sleeve t-shirt: https://a.co/d/86BAbRk  ~Knee Patients: We recommend oversized pants/shorts or a dress to accommodate any knee braces in place. Braces will not be removed to get dressed.    [facility] Visitor/Ride Policy:    Only 1  adult allowed (18 or older) to accompany you and MUST STAY through the entire admission length.      -Must have a ride home from a responsible adult that you know and trust.     -Medical Transport, Uber or Lyft can only be used if patient has a responsible adult to   accompany them during ride home.      ~Your ride MUST STAY the entire time until you are discharged~   Please notify the pre-admit department prior to surgery if you use medication transportation so we can verify your arrival/pickup time!   -The patient is responsible for setting up their own transportation!    Discharge Prescriptions:  Your discharge prescriptions will be sent next door to [facility], unless otherwise discussed with your surgeon. Your  will be responsible for calling the pharmacy ([phone]) to begin the prescription filling process. They will receive a text message with instructions once you are in recovery. Please make sure we have your insurance information and you bring a payment method (cash or card) if needed for prescriptions. If you have  insurance, please let the pre-op nurse know, as the pharmacy does not take this insurance.     *If you are running late or have questions the morning of surgery, please call the Pre-OP Department @ [phone].       *Please Call [facility] Pre-Admit Department for surgery instruction questions: (M-F 8AM-4:30PM)  [phone]     Financial Questions:  Giovanny: [phone]  Hours ~ 5AM-1:30PM  Monday-Friday    Billing Question Numbers:   [phone]

## 2025-04-28 NOTE — TELEPHONE ENCOUNTER
.Outgoing call attempted to notify patient regarding below but no answer to home and just rings, no answer to mobile, left voice message with contact information letting patient know he can contact us at his earliest convenience to confirm his procedure tomorrow or cancel. Also attempted to contact alternate  with no answer, left a voice message as well.

## 2025-04-28 NOTE — TELEPHONE ENCOUNTER
Message left for pt to return call     ----- Message from Summer sent at 4/28/2025 12:50 PM CDT -----  Contact: Alexander  Type:  Patient Returning CallWho Called: Alexander Who Left Message for Patient: Elias Severino RNDoes the patient know what this is regarding?: upcoming procedure Would the patient rather a call back or a response via [facility]? 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

## 2025-04-29 ENCOUNTER — ANESTHESIA (OUTPATIENT)
Dept: SURGERY | Facility: HOSPITAL | Age: 85
End: 2025-04-29
Payer: MEDICARE

## 2025-05-09 ENCOUNTER — TELEPHONE (OUTPATIENT)
Dept: UROLOGY | Facility: CLINIC | Age: 85
End: 2025-05-09
Payer: MEDICARE

## 2025-05-09 DIAGNOSIS — D49.4 BLADDER NEOPLASM: Primary | ICD-10-CM

## 2025-05-09 DIAGNOSIS — Z01.818 PREOP TESTING: ICD-10-CM

## 2025-05-09 NOTE — TELEPHONE ENCOUNTER
.Outgoing call attempted to notify patient regarding any pre-op questions and holding medication prior to surgery but no answer, no MyChart assess and no voice mail picked up.

## 2025-05-12 ENCOUNTER — PATIENT MESSAGE (OUTPATIENT)
Dept: RESPIRATORY THERAPY | Facility: HOSPITAL | Age: 85
End: 2025-05-12
Payer: MEDICARE

## 2025-05-13 ENCOUNTER — HOSPITAL ENCOUNTER (OUTPATIENT)
Facility: HOSPITAL | Age: 85
Discharge: HOME | End: 2025-05-13
Attending: UROLOGY | Admitting: UROLOGY
Payer: MEDICARE

## 2025-05-13 ENCOUNTER — HOSPITAL ENCOUNTER (OUTPATIENT)
Dept: RADIOLOGY | Facility: HOSPITAL | Age: 85
Discharge: HOME | End: 2025-05-13
Attending: UROLOGY | Admitting: UROLOGY
Payer: MEDICARE

## 2025-05-13 VITALS
WEIGHT: 188.19 LBS | RESPIRATION RATE: 14 BRPM | TEMPERATURE: 98 F | BODY MASS INDEX: 24.94 KG/M2 | SYSTOLIC BLOOD PRESSURE: 177 MMHG | HEIGHT: 73 IN | DIASTOLIC BLOOD PRESSURE: 75 MMHG | OXYGEN SATURATION: 97 % | HEART RATE: 53 BPM

## 2025-05-13 DIAGNOSIS — D49.4 BLADDER NEOPLASM: ICD-10-CM

## 2025-05-13 DIAGNOSIS — R52 PAIN: Primary | ICD-10-CM

## 2025-05-13 LAB
POCT GLUCOSE: 169 MG/DL (ref 70–110)
POCT GLUCOSE: 178 MG/DL (ref 70–110)

## 2025-05-13 PROCEDURE — 36000706: Performed by: UROLOGY

## 2025-05-13 PROCEDURE — 36000707: Performed by: UROLOGY

## 2025-05-13 PROCEDURE — 74018 RADEX ABDOMEN 1 VIEW: CPT | Mod: 26,,, | Performed by: RADIOLOGY

## 2025-05-13 PROCEDURE — 74018 RADEX ABDOMEN 1 VIEW: CPT | Mod: TC

## 2025-05-13 PROCEDURE — 82962 GLUCOSE BLOOD TEST: CPT | Performed by: UROLOGY

## 2025-05-13 PROCEDURE — 74420 UROGRAPHY RTRGR +-KUB: CPT | Mod: 26,,, | Performed by: UROLOGY

## 2025-05-13 PROCEDURE — 25500020 PHARM REV CODE 255: Performed by: UROLOGY

## 2025-05-13 PROCEDURE — C1758 CATHETER, URETERAL: HCPCS | Performed by: UROLOGY

## 2025-05-13 PROCEDURE — 71000015 HC POSTOP RECOV 1ST HR: Performed by: UROLOGY

## 2025-05-13 PROCEDURE — 37000008 HC ANESTHESIA 1ST 15 MINUTES: Performed by: UROLOGY

## 2025-05-13 PROCEDURE — 88342 IMHCHEM/IMCYTCHM 1ST ANTB: CPT | Mod: TC | Performed by: UROLOGY

## 2025-05-13 PROCEDURE — 37000009 HC ANESTHESIA EA ADD 15 MINS: Performed by: UROLOGY

## 2025-05-13 PROCEDURE — 63600175 PHARM REV CODE 636 W HCPCS: Performed by: UROLOGY

## 2025-05-13 PROCEDURE — 71000033 HC RECOVERY, INTIAL HOUR: Performed by: UROLOGY

## 2025-05-13 PROCEDURE — 63600175 PHARM REV CODE 636 W HCPCS: Performed by: ANESTHESIOLOGY

## 2025-05-13 PROCEDURE — 25000003 PHARM REV CODE 250: Performed by: UROLOGY

## 2025-05-13 PROCEDURE — 52204 CYSTOSCOPY W/BIOPSY(S): CPT | Mod: ,,, | Performed by: UROLOGY

## 2025-05-13 PROCEDURE — 27200651 HC AIRWAY, LMA: Performed by: ANESTHESIOLOGY

## 2025-05-13 RX ORDER — HYDROCODONE BITARTRATE AND ACETAMINOPHEN 5; 325 MG/1; MG/1
1 TABLET ORAL EVERY 6 HOURS PRN
Qty: 20 TABLET | Refills: 0 | Status: SHIPPED | OUTPATIENT
Start: 2025-05-13

## 2025-05-13 RX ORDER — HYDROCODONE BITARTRATE AND ACETAMINOPHEN 5; 325 MG/1; MG/1
1 TABLET ORAL
Status: DISCONTINUED | OUTPATIENT
Start: 2025-05-13 | End: 2025-05-19 | Stop reason: HOSPADM

## 2025-05-13 RX ORDER — CEFTRIAXONE 2 G/1
INJECTION, POWDER, FOR SOLUTION INTRAMUSCULAR; INTRAVENOUS
Status: COMPLETED
Start: 2025-05-13 | End: 2025-05-13

## 2025-05-13 RX ORDER — LIDOCAINE HYDROCHLORIDE 20 MG/ML
INJECTION INTRAVENOUS
Status: DISCONTINUED | OUTPATIENT
Start: 2025-05-13 | End: 2025-05-13

## 2025-05-13 RX ORDER — ONDANSETRON HYDROCHLORIDE 2 MG/ML
INJECTION, SOLUTION INTRAVENOUS
Status: DISCONTINUED | OUTPATIENT
Start: 2025-05-13 | End: 2025-05-13

## 2025-05-13 RX ORDER — DIPHENHYDRAMINE HYDROCHLORIDE 50 MG/ML
25 INJECTION, SOLUTION INTRAMUSCULAR; INTRAVENOUS EVERY 6 HOURS PRN
Status: DISCONTINUED | OUTPATIENT
Start: 2025-05-13 | End: 2025-05-19 | Stop reason: HOSPADM

## 2025-05-13 RX ORDER — FENTANYL CITRATE 50 UG/ML
INJECTION, SOLUTION INTRAMUSCULAR; INTRAVENOUS
Status: DISCONTINUED | OUTPATIENT
Start: 2025-05-13 | End: 2025-05-13

## 2025-05-13 RX ORDER — ONDANSETRON HYDROCHLORIDE 2 MG/ML
4 INJECTION, SOLUTION INTRAVENOUS ONCE AS NEEDED
Status: DISCONTINUED | OUTPATIENT
Start: 2025-05-13 | End: 2025-05-19 | Stop reason: HOSPADM

## 2025-05-13 RX ORDER — LIDOCAINE HYDROCHLORIDE 20 MG/ML
JELLY TOPICAL
Status: DISCONTINUED
Start: 2025-05-13 | End: 2025-05-13 | Stop reason: HOSPADM

## 2025-05-13 RX ORDER — HYOSCYAMINE SULFATE 0.12 MG/1
0.12 TABLET SUBLINGUAL EVERY 6 HOURS PRN
Qty: 30 TABLET | Refills: 0 | Status: SHIPPED | OUTPATIENT
Start: 2025-05-13

## 2025-05-13 RX ORDER — PROPOFOL 10 MG/ML
VIAL (ML) INTRAVENOUS
Status: DISCONTINUED | OUTPATIENT
Start: 2025-05-13 | End: 2025-05-13

## 2025-05-13 RX ORDER — LIDOCAINE HYDROCHLORIDE 20 MG/ML
JELLY TOPICAL
Status: DISCONTINUED | OUTPATIENT
Start: 2025-05-13 | End: 2025-05-13 | Stop reason: HOSPADM

## 2025-05-13 RX ORDER — ALBUTEROL SULFATE 0.83 MG/ML
2.5 SOLUTION RESPIRATORY (INHALATION) EVERY 4 HOURS PRN
Status: DISCONTINUED | OUTPATIENT
Start: 2025-05-13 | End: 2025-05-19 | Stop reason: HOSPADM

## 2025-05-13 RX ORDER — FENTANYL CITRATE 50 UG/ML
25 INJECTION, SOLUTION INTRAMUSCULAR; INTRAVENOUS EVERY 5 MIN PRN
Status: DISCONTINUED | OUTPATIENT
Start: 2025-05-13 | End: 2025-05-19 | Stop reason: HOSPADM

## 2025-05-13 RX ORDER — CEFTRIAXONE 2 G/1
2 INJECTION, POWDER, FOR SOLUTION INTRAMUSCULAR; INTRAVENOUS
Status: COMPLETED | OUTPATIENT
Start: 2025-05-13 | End: 2025-05-13

## 2025-05-13 RX ORDER — DEXAMETHASONE SODIUM PHOSPHATE 4 MG/ML
INJECTION, SOLUTION INTRA-ARTICULAR; INTRALESIONAL; INTRAMUSCULAR; INTRAVENOUS; SOFT TISSUE
Status: DISCONTINUED | OUTPATIENT
Start: 2025-05-13 | End: 2025-05-13

## 2025-05-13 RX ADMIN — PROPOFOL 110 MG: 10 INJECTION, EMULSION INTRAVENOUS at 11:05

## 2025-05-13 RX ADMIN — DEXAMETHASONE SODIUM PHOSPHATE 4 MG: 4 INJECTION, SOLUTION INTRA-ARTICULAR; INTRALESIONAL; INTRAMUSCULAR; INTRAVENOUS; SOFT TISSUE at 11:05

## 2025-05-13 RX ADMIN — GLYCOPYRROLATE 0.2 MG: 0.2 INJECTION, SOLUTION INTRAMUSCULAR; INTRAVENOUS at 11:05

## 2025-05-13 RX ADMIN — FENTANYL CITRATE 25 MCG: 50 INJECTION, SOLUTION INTRAMUSCULAR; INTRAVENOUS at 11:05

## 2025-05-13 RX ADMIN — LIDOCAINE HYDROCHLORIDE 80 MG: 20 INJECTION INTRAVENOUS at 11:05

## 2025-05-13 RX ADMIN — FENTANYL CITRATE 50 MCG: 50 INJECTION, SOLUTION INTRAMUSCULAR; INTRAVENOUS at 11:05

## 2025-05-13 RX ADMIN — ONDANSETRON 4 MG: 2 INJECTION INTRAMUSCULAR; INTRAVENOUS at 11:05

## 2025-05-13 RX ADMIN — CEFTRIAXONE SODIUM 2 G: 2 INJECTION, POWDER, FOR SOLUTION INTRAMUSCULAR; INTRAVENOUS at 11:05

## 2025-05-13 RX ADMIN — SODIUM CHLORIDE, POTASSIUM CHLORIDE, SODIUM LACTATE AND CALCIUM CHLORIDE: 600; 310; 30; 20 INJECTION, SOLUTION INTRAVENOUS at 11:05

## 2025-05-13 NOTE — TRANSFER OF CARE
"Anesthesia Transfer of Care Note    Patient: Alexander Crawford    Procedure(s) Performed: Procedure(s) (LRB):  CYSTOSCOPY, WITH BLADDER BIOPSY, WITH FULGURATION IF INDICATED (Bilateral)    Patient location: PACU    Anesthesia Type: general    Transport from OR: Transported from OR on room air with adequate spontaneous ventilation    Post pain: adequate analgesia    Post assessment: no apparent anesthetic complications and tolerated procedure well    Post vital signs: stable    Level of consciousness: awake    Nausea/Vomiting: no nausea/vomiting    Complications: none    Transfer of care protocol was followed      Last vitals: Visit Vitals  BP (!) 195/91   Pulse (!) 58   Temp 36.4 °C (97.6 °F) (Temporal)   Resp 13   Ht 6' 1" (1.854 m)   Wt 85.3 kg (188 lb 2.6 oz)   SpO2 100%   BMI 24.83 kg/m²     "

## 2025-05-13 NOTE — DISCHARGE INSTRUCTIONS
Nozin Instructions  Adults and children over 12 years old  Goal: the goal of Nozin is to reduce the risk of post-procedural infections by bacteria in the nasal cavity. Think of it as hand  for your nose.    How to use:    1. Shake Nozin bottle well    2. Take a cotton swab and apply 4 drops to one tip    3. Insert cotton tip into one nostril, being sure not to go deeper into nose than tip of the swab.    4. Swab nostril 8 times counterclockwise and 8 times clockwise. Make sure to swab the inside front pocket of the nostril.    5. Take swab out and apply 2 drops to the same cotton tip. Repeat steps 3 and 4 in the other nostril.     Do steps 1-5 twice a day for 7 days

## 2025-05-13 NOTE — OP NOTE
Surgeon:  Dr Raoul Rachel.    Date:  5-.    Pre operative diagnosis:  1) Bladder lesions.  2) History of bladder cancer.    Post operative diagnosis:  1) Bladder lesions.  2) History of bladder cancer.    Surgery:  1) Cystoscopy with bladder biopsies with fulguration of a 2.5 cm right sided posterior bladder lesion and of a 2 cm right sided posterior bladder lesion.  2) Bilateral retrograde pyelograms.    Anesthesia:  General.    Estimated blood loss:  2 ml.    Complications:  None.    Specimens:  Bladder lesions for permanent specimen.    Procedure in details:  Risks and benefits of the surgery were explained to the patient prior to the surgery and the patient expressed understanding.  All questions were answered by me to the patient's apparent understanding and to the patient's apparent satisfaction.  Surgery consent was signed by the patient prior to the surgery.  The patient was taken to the OR and placed in the supine position initially.  Anesthesia was administered by the Anesthesia team.  The patient was then placed in the dorsal lithotomy position.  The patient was prepped and drapped in the usual sterile fashion.  An IV antibiotic was given to the patient prior to the surgery.  A timeout was done prior to the surgery.  A 21 F rigid cystoscope was advanced through the urethra into his bladder.  The bladder was inspected in a systematic fashion.   His bilateral ureteral orifices are orthotopic and patent with clear efflux.  An approximately 2 cm red flat right posterior bladder wall lesion located just posterior to his right ureteral orifice is seen.  Another approximately 2.5 cm red flat right posterior bladder wall lesion located closer to the dome of his bladder is seen.  No other bladder tumors or urothelial lesions are seen.  No bladder stones and no foreign bodies are seen.  I used the biopsy forceps and I performed four bladder biopsies of his approximately 2 cm red flat right posterior bladder  wall lesion located just posterior to his right ureteral orifice.  Once this bladder lesion was no longer seen visually, I used the bugbee to fulgurate these bladder biopsy sites.  I asked the operating room nurse to send all of these bladder biopsy specimens to pathology for permanent specimen.  I then used the biopsy forceps and I performed four bladder biopsies of his approximately 2.5 cm red flat right posterior bladder wall lesion located closer to the dome of his bladder.  Once this bladder lesion was no longer seen visually, I used the bugbee to fulgurate these bladder biopsy sites.   I asked the operating room nurse to send all of these bladder biopsy specimens to pathology for permanent specimen.  I turned down the sterile water flow and there was no bleeding from any of the bladder biopsy sites.  No visible tumor or abnormal urothelial lesion was seen at this time.  His right ureteral orifice was cannulated with a 5 F open ended ureteral catheter and a gentle right retrograde pyelogram was shot.  This was normal with no filling defect, no hydronephrosis, no extravasation, and good drainage of his right kidney.  His left ureteral orifice was then cannulated with a 5 F open ended ureteral catheter and a gentle left retrograde pyelogram was shot.  This was normal with no filling defect, no hydronephrosis, no extravasation, and good drainage of his left kidney.  I checked all the bladder biopsy sites again and there was no bleeding from any of the bladder biopsy sites.  The cystoscope was then used to completely drain his bladder and the cystoscope was removed from his body.  A 20 F urethral stacy catheter was placed into his bladder.  When yellow colored urine drained from his stacy catheter, 10 cc of sterile water was inflated into his stacy catheter balloon.  I manually irrigated his stacy catheter with 50 cc of sterile water and his stacy catheter irrigated easily and his urine is clear.  A large drainage  bag was attached to his stacy catheter.  A post operative timeout was done at the end of the procedure.  The patient was taken to the recovery room in stable condition at the end of the procedure.

## 2025-05-13 NOTE — PROGRESS NOTES
Notified Forth of pressure 195/91 with no signs of symptoms of high BP. Plan to educate pt and family to make sure he takes his BP med when he gets home.

## 2025-05-13 NOTE — ANESTHESIA PROCEDURE NOTES
Intubation    Date/Time: 5/13/2025 11:16 AM    Performed by: Deb Young CRNA  Authorized by: Deb Young CRNA    Intubation:     Induction:  Intravenous    Intubated:  Postinduction    Mask Ventilation:  Easy mask    Attempts:  1    Attempted By:  CRNA    Difficult Airway Encountered?: No      Complications:  None    Airway Device:  Supraglottic airway/LMA    Airway Device Size:  4.0    Style/Cuff Inflation:  Uncuffed    Placement Verified By:  Capnometry    Complicating Factors:  None    Findings Post-Intubation:  BS equal bilateral and atraumatic/condition of teeth unchanged

## 2025-05-13 NOTE — H&P
[facility]  Urology  History & Physical    Patient Name: Alexander Crawford  MRN: [id number]  Admission Date: 5/13/2025  Code Status: No Order   Attending Provider: Raoul Rachel MD   Primary Care Physician: No, Primary Doctor  Principal Problem:<principal problem not specified>    Subjective:     HPI:  Mr Crawford Bladder cancer underwent a cystoscopy by me on 4-9-25 that showed a flat red approximately 2 cm bladder lesion at the right posterior bladder wall just behind the trigone.  There is another similar appearing flat red approximately 2 cm bladder lesion at the right posterior bladder that is closer towards the dome of his bladder.  Moderate bladder trabeculations.  Mild BPH.    He has a history of a cystoscopy with bladder biopsies with fulguration of 3 small flat red bladder lesions (2 located at the posterior bladder wall and 1 located at the left dome of the bladder) with normal bilateral retrograde pyelograms by me on 12-3-24.  Path:  Chronic cystitis.  Negative for malignancy.    He has a history of fladder cancer in multiple sites in his bladder s/p transurethral resection of several bladder tumors, bilateral retrograde pyelograms, and right ureteral stent placement on 5-3-24.  Path of his midline posterior wall, left posterior wall, two dome tumors, and trigone near the right ureteral orifice all showed high grade papillary urothelial carcinoma, no definitive evidence of invasion, and muscularis propria is present and uninvolved by tumor.  His right ureteral stent was removed cystoscopically on 5-24-24.      He underwent 6 weeks of induction BCG treatments starting on 12-6-23.  He underwent a second round of 6 weeks of BCG treatments from 6-6-24 to 8-19-24.    He has a history of a TURBT and bladder biopsies with fulguration of 5 bladder tumors, normal bilateral RGPs, and right ureteral stent placement on 11-8-23.  Path:  Right trigone and right floor: high grade papillary urothelial  carcinoma invasive into the lamina propria, muscularis propria is present and uninvolved.  Other biopsy locations showed high grade papillary urothelial carcinoma non invasive.      He has a history of bladder biopsies on 8-29-23 by Dr Moe Henning, Urologist in [place].  Path showed high grade urothelial carcinoma with no definitive invasion.      Past Medical History:   Diagnosis Date    Bladder cancer     CAD (coronary artery disease)     CVA (cerebral vascular accident)     Diabetes mellitus     Diabetes mellitus, type 2     HLD (hyperlipidemia)     Hypertension     Myocardial infarction        Past Surgical History:   Procedure Laterality Date    BACK SURGERY      3 back surgeries    CAROTID ENDARTERECTOMY Bilateral     CORONARY ANGIOPLASTY      CYSTOSCOPY      CYSTOSCOPY W/ URETERAL STENT PLACEMENT Right 5/3/2024    Procedure: CYSTOSCOPY, WITH URETERAL STENT INSERTION;  Surgeon: Raoul Rachel MD;  Location: [facility] OR;  Service: Urology;  Laterality: Right;    CYSTOSCOPY WITH BIOPSY OF BLADDER Bilateral 12/3/2024    Procedure: CYSTOSCOPY, WITH BLADDER BIOPSY, WITH FULGURATION IF INDICATED;  Surgeon: Raoul Rachel MD;  Location: [facility] OR;  Service: Urology;  Laterality: Bilateral;    RETROGRADE PYELOGRAPHY Bilateral 5/3/2024    Procedure: PYELOGRAM, RETROGRADE;  Surgeon: Raoul Rachel MD;  Location: [facility] OR;  Service: Urology;  Laterality: Bilateral;    RETROGRADE PYELOGRAPHY Bilateral 12/3/2024    Procedure: PYELOGRAM, RETROGRADE;  Surgeon: Raoul Rachel MD;  Location: [facility] OR;  Service: Urology;  Laterality: Bilateral;    TURBT (TRANSURETHRAL RESECTION OF BLADDER TUMOR)      x2    TURBT (TRANSURETHRAL RESECTION OF BLADDER TUMOR) Bilateral 5/3/2024    Procedure: TURBT (TRANSURETHRAL RESECTION OF BLADDER TUMOR);  Surgeon: Raoul Rachel MD;  Location: [facility] OR;  Service: Urology;  Laterality: Bilateral;       Review of patient's allergies indicates:  No Known Allergies    Family History        Problem Relation (Age of Onset)    Lung cancer Father            Tobacco Use    Smoking status: Former     Types: Cigarettes    Smokeless tobacco: Never    Tobacco comments:     Quit 30 yrs ago.    Substance and Sexual Activity    Alcohol use: Not Currently     Comment: Ocassionally drinks beer.    Drug use: Never    Sexual activity: Not on file       Review of Systems    Objective:     Temp:  [97.8 °F (36.6 °C)] 97.8 °F (36.6 °C)  Pulse:  [59] 59  Resp:  [16] 16  SpO2:  [98 %] 98 %  BP: (199-227)/() 199/92     Body mass index is 24.83 kg/m².    No intake/output data recorded.       Lines/Drains/Airways       Peripheral Intravenous Line  Duration                  Peripheral IV - Single Lumen 05/13/25 0835 20 G Left;Posterior Hand <1 day                    Physical Exam    NAD  A&OX4  RRR  Resp even and unlabored  Abd soft, benign, ND     Assessment and Plan:     There are no hospital problems to display for this patient.      VTE Risk Mitigation (From admission, onward)           Ordered     IP VTE HIGH RISK PATIENT  Once         05/13/25 0752     Place sequential compression device  Until discontinued         05/13/25 0752                       10-14-24  CT urogram.  See report.  Kidneys/ Ureters: Punctate nonobstructing right renal calculus.  Too small to characterize hypodensities in both kidneys statistically representing cysts.  No hydronephrosis.  No hydroureter.  Both ureters are well opacified; no urothelial lesion in the bilateral renal pelves or ureters.  Bladder: Irregular bladder wall thickening posteriorly, more pronounced around the right ureteral vesicular junction.  There is also some bladder wall thickening along the dome of the bladder and anteriorly.  Reproductive organs: Unremarkable.  There are a few pulmonary nodules bilaterally such as the 6 x 4 mm nodule in the inferior right upper lobe. Additional index pulmonary nodule in the right upper lobe measures 8 mm x 6 mm.  Small gallstones.   Significant lumbar degenerative changes most pronounced at the L2-L3 level where there is at least moderate central canal stenosis.      11-22-23  Renal ultrasound.  No hydronephrosis or renal stone is seen bilaterally.  9 mm, 7 mm, and 6 mm adjacent simple appearing left renal cysts.  No right renal lesion is seen.     I reviewed his above imaging result.            10-2-24  PSA 1.4     10-13-23  PSA 0.99     10-2-24  Cr 1.6.  GFR 42.5.  BUN 20.     4-24-24  Cr 1.1.  GFR >60.     10-16-24  Urine cytology.  Atypical urothelial cells.     10-16-24  Urine urovysion.  Suspicious.     I reviewed his above lab result.            Assessment:  - Bladder cancer.  - Cystoscopy by me on 4-9-25 showed a flat red approximately 2 cm bladder lesion at the right posterior bladder wall just behind the trigone.  There is another similar appearing flat red approximately 2 cm bladder lesion at the right posterior bladder that is closer towards the dome of his bladder.  Moderate bladder trabeculations.  Mild BPH.  - History of a cystoscopy with bladder biopsies with fulguration of 3 small flat red bladder lesions (2 located at the posterior bladder wall and 1 located at the left dome of the bladder) with normal bilateral retrograde pyelograms by me on 12-3-24.  Path:  Chronic cystitis.  Negative for malignancy.  - Urine cytology on 10-16-24 showed atypical urothelial cells.  Urine urovysion on 10-16-24 was suspicious.  - CT urogram on 10-14-24 showed:  Kidneys/ Ureters: Punctate nonobstructing right renal calculus.  Too small to characterize hypodensities in both kidneys statistically representing cysts.  No hydronephrosis.  No hydroureter.  Both ureters are well opacified; no urothelial lesion in the bilateral renal pelves or ureters.  Bladder: Irregular bladder wall thickening posteriorly, more pronounced around the right ureteral vesicular junction.  There is also some bladder wall thickening along the dome of the bladder and  anteriorly.  Reproductive organs: Unremarkable.  There are a few pulmonary nodules bilaterally such as the 6 x 4 mm nodule in the inferior right upper lobe. Additional index pulmonary nodule in the right upper lobe measures 8 mm x 6 mm.    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  Path of his midline posterior wall, left posterior wall, two dome tumors, and trigone near the right ureteral orifice all showed high grade papillary urothelial carcinoma, no definitive evidence of invasion, and muscularis propria is present and uninvolved by tumor.  His right ureteral stent was removed cystoscopically on 5-24-24.    - He underwent 6 weeks of induction BCG treatments starting on 12-6-23.  - He underwent a second round of 6 weeks of BCG treatments from 6-6-24 to 8-19-24.  - History of a TURBT and bladder biopsies with fulguration of 5 bladder tumors, normal bilateral RGPs, and right ureteral stent placement on 11-8-23.  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  Other biopsy locations showed high grade papillary urothelial carcinoma non invasive.    - History of bladder biopsies on 8-29-23 by Dr Moe Henning, Urologist in [place].  Path showed high grade urothelial carcinoma with no definitive invasion.  - 20 pound unintentional weight loss this year per the patient.  - Persistent microscopic hematuria.    - Nocturia and OAB.  He was started on oxybutynin 5 mg ER 1 PO qhs on 10-16-24 without improvement in his symptoms.  Oxybutynin 5 mg ER 1 PO qhs was increased to 10 mg ER 1 PO qhs on 11-13-24.  He says the oxybutynin 10 mg ER was not effective and he is no longer taking it.  - Mild BPH without obstructive LUTS.  - He is a former smoker.  - CAD.  History of an  MI.  He takes aspirin 81 mg and plavix.  Dr Ty Suh is his Cardiologist.     Plan:  - I discussed options with the patient after his cystoscopy and the mutual decision was made to proceed to the OR for cystoscopy with bladder biopsies with fulguration, possible transurethral resection of bladder tumors, bilateral retrograde pyelograms, possible bilateral ureteral stents placement, and any other indicated procedures.  Risks and benefits of the surgery were explained to the patient and the patient expressed understanding.  All questions were answered by me to the patient's apparent understanding and to the patient's apparent satisfaction.  Surgery consent was signed by the patient.   - Referral to his Cardiologist, Dr yT Suh, for preop cardiac clearance and for clearance to stop blood thinners prior to his surgery that is scheduled for 4-29-25.  - Started solifenacin 10 mg ER 1 PO qhs on 4-9-25.  - I discussed dietary modifications with him today and I recommended he drink mostly water during the day.  - I recommended he limit his fluid intake at night to small amounts of water and to void just prior to bedtime.   - He has a CT chest without IV contrast on 4-14-25.        Raoul Rachel MD  Urology  [facility]

## 2025-05-13 NOTE — ANESTHESIA POSTPROCEDURE EVALUATION
Anesthesia Post Evaluation    Patient: Alexander Crawford    Procedure(s) Performed: Procedure(s) (LRB):  CYSTOSCOPY, WITH BLADDER BIOPSY, WITH FULGURATION IF INDICATED (Bilateral)  PYELOGRAM, RETROGRADE (Bilateral)    Final Anesthesia Type: general      Patient location during evaluation: PACU  Patient participation: Yes- Able to Participate  Level of consciousness: awake  Post-procedure vital signs: reviewed and stable  Pain management: adequate  Airway patency: patent    PONV status at discharge: No PONV  Anesthetic complications: no      Cardiovascular status: stable and hypertensive  Respiratory status: unassisted  Hydration status: euvolemic  Follow-up not needed.              Vitals Value Taken Time   /81 05/13/25 12:26   Temp 36.4 °C (97.6 °F) 05/13/25 12:05   Pulse 51 05/13/25 12:30   Resp 15 05/13/25 12:30   SpO2 97 % 05/13/25 12:30   Vitals shown include unfiled device data.      No case tracking events are documented in the log.      Pain/Toshia Score: Toshia Score: 7 (5/13/2025 12:20 PM)

## 2025-05-14 ENCOUNTER — TELEPHONE (OUTPATIENT)
Dept: UROLOGY | Facility: CLINIC | Age: 85
End: 2025-05-14
Payer: MEDICARE

## 2025-05-14 NOTE — TELEPHONE ENCOUNTER
.Outgoing call placed to patient, patient verified name and date of birth,  patient notified of below, he verbalized understanding and stated he can't come on Friday, he requested Monday afternoon after 2pm since he has to drive so far, offered the address and he stated he did not need it because he has been there before, no further assistance needed.     ----- Message from Raoul Rachel MD sent at 5/14/2025  5:04 PM CDT -----  Regarding: RE: appt req  Contact: pt  I sent a secure chat yesterday to have this patient scheduled for either this Friday or next Monday to see me and to remove his stacy.  I just added you to this secure chat today.  Will you please take care of this?  Thank you!  ----- Message -----  From: Elias Severino RN  Sent: 5/14/2025   3:54 PM CDT  To: Raoul Rachel MD  Subject: FW: appt req                                     Please advise, when would you like for this patient to follow up?  ----- Message -----  From: Radha Corado  Sent: 5/14/2025  10:37 AM CDT  To: [facility] Staff  Subject: appt req                                         Type: Appointment RequestCaller is requesting an appointmentName of Caller: PtRmuniraon for appointment: Post-opWould the patient rather a call back or a response via [facility]? Call [facility] Call Back Number:  [phone] Information: Please call, Thank You   Normal

## 2025-05-15 ENCOUNTER — TELEPHONE (OUTPATIENT)
Dept: UROLOGY | Facility: CLINIC | Age: 85
End: 2025-05-15
Payer: MEDICARE

## 2025-05-15 NOTE — TELEPHONE ENCOUNTER
Called patient back in regards to the message. Patient did not answer, unable to leave voicemail after phone rang for 4 minutes and I got a message stating that my call could not be completed at this time I hung up the call.        ----- Message from Karla sent at 5/15/2025  9:55 AM CDT -----  Contact: Alexander  Type:  Patient Call Back Request Who Called: Alexander Message for Patient: Nurse What this is regarding?: upcoming visit Would the patient rather a call back or a response via [facility]? Call back Best Call Back Number: [phone] Information:

## 2025-05-19 ENCOUNTER — OFFICE VISIT (OUTPATIENT)
Facility: CLINIC | Age: 85
End: 2025-05-19
Payer: MEDICARE

## 2025-05-19 VITALS
RESPIRATION RATE: 16 BRPM | DIASTOLIC BLOOD PRESSURE: 83 MMHG | SYSTOLIC BLOOD PRESSURE: 182 MMHG | WEIGHT: 188.06 LBS | BODY MASS INDEX: 24.81 KG/M2 | HEART RATE: 64 BPM

## 2025-05-19 DIAGNOSIS — R35.1 NOCTURIA: ICD-10-CM

## 2025-05-19 DIAGNOSIS — C67.8 MALIGNANT NEOPLASM OF OVERLAPPING SITES OF BLADDER: Primary | ICD-10-CM

## 2025-05-19 DIAGNOSIS — R91.8 PULMONARY NODULES: ICD-10-CM

## 2025-05-19 DIAGNOSIS — N32.81 OVERACTIVE BLADDER: ICD-10-CM

## 2025-05-19 PROCEDURE — 1126F AMNT PAIN NOTED NONE PRSNT: CPT | Mod: CPTII,S$GLB,, | Performed by: UROLOGY

## 2025-05-19 PROCEDURE — 3288F FALL RISK ASSESSMENT DOCD: CPT | Mod: CPTII,S$GLB,, | Performed by: UROLOGY

## 2025-05-19 PROCEDURE — 3077F SYST BP >= 140 MM HG: CPT | Mod: CPTII,S$GLB,, | Performed by: UROLOGY

## 2025-05-19 PROCEDURE — 99999 PR PBB SHADOW E&M-EST. PATIENT-LVL III: CPT | Mod: PBBFAC,,, | Performed by: UROLOGY

## 2025-05-19 PROCEDURE — 3079F DIAST BP 80-89 MM HG: CPT | Mod: CPTII,S$GLB,, | Performed by: UROLOGY

## 2025-05-19 PROCEDURE — 99214 OFFICE O/P EST MOD 30 MIN: CPT | Mod: S$GLB,,, | Performed by: UROLOGY

## 2025-05-19 PROCEDURE — 1101F PT FALLS ASSESS-DOCD LE1/YR: CPT | Mod: CPTII,S$GLB,, | Performed by: UROLOGY

## 2025-05-19 PROCEDURE — 1159F MED LIST DOCD IN RCRD: CPT | Mod: CPTII,S$GLB,, | Performed by: UROLOGY

## 2025-05-19 NOTE — PROGRESS NOTES
Subjective:       Patient ID: Alexander Crawford is a 84 y.o. male.    Chief Complaint: Post-op Evaluation      History of Present Illness:     Mr Crawford has bladder cancer and he underwent a cystoscopy with bladder biopsies with fulguration of a red flat lesion at the right posterior bladder wall and another red flat lesion at the right posterior bladder wall closer to the dome with normal bilateral RGPs by me on 5-13-25.  Path:  Pending. He says his stacy catheter is working well.  No gross hematuria.  He says he is not having any pain.  He says he has had 5 pounds of unintentional weight loss over the last several months.    He has a history of a cystoscopy with bladder biopsies with fulguration of 3 small flat red bladder lesions (2 located at the posterior bladder wall and 1 located at the left dome of the bladder) with normal bilateral retrograde pyelograms by me on 12-3-24.  Path:  Chronic cystitis.  Negative for malignancy.  Urine cytology on 10-16-24 showed atypical urothelial cells.  Urine urovysion on 10-16-24 was suspicious.    He underwent a CT urogram on 10-14-24 that showed a few pulmonary nodules bilaterally such as the 6 x 4 mm nodule in the inferior right upper lobe. Additional index pulmonary nodule in the right upper lobe measures 8 mm x 6 mm.      History of bladder cancer in multiple sites in his bladder s/p transurethral resection of several bladder tumors, bilateral retrograde pyelograms, and right ureteral stent placement on 5-3-24.  Path of his midline posterior wall, left posterior wall, two dome tumors, and trigone near the right ureteral orifice all showed high grade papillary urothelial carcinoma, no definitive evidence of invasion, and muscularis propria is present and uninvolved by tumor.  His right ureteral stent was removed cystoscopically on 5-24-24.      He underwent 6 weeks of induction BCG treatments starting on 12-6-23.  He underwent a second round of 6 weeks of BCG treatments from  6-6-24 to 8-19-24.    He has a history of a TURBT and bladder biopsies with fulguration of 5 bladder tumors, normal bilateral RGPs, and right ureteral stent placement on 11-8-23.  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  Other biopsy locations showed high grade papillary urothelial carcinoma non invasive.    - History of bladder biopsies on 8-29-23 by Dr Moe Henning, Urologist in [place].  Path showed high grade urothelial carcinoma with no definitive invasion.    Mr Crawford has nocturia, urinary frequency, and urinary urgency that has improved some with solifenacin 10 mg 1 PO qhs that was started on 4-9-25.  He was started on oxybutynin 5 mg ER 1 PO qhs on 10-16-24 without improvement in his symptoms.  Oxybutynin 5 mg ER 1 PO qhs was increased to 10 mg ER 1 PO qhs on 11-13-24.  He said the oxybutynin 10 mg ER was not effective so he stopped taking it.         Past Medical History:   Diagnosis Date    Bladder cancer     CAD (coronary artery disease)     CVA (cerebral vascular accident)     Diabetes mellitus     Diabetes mellitus, type 2     HLD (hyperlipidemia)     Hypertension     Myocardial infarction      Family History   Problem Relation Name Age of Onset    Lung cancer Father       Social History[1]  Encounter Medications[2]     Review of Systems   Constitutional:  Negative for chills and fever.   Respiratory:  Negative for shortness of breath.    Cardiovascular:  Negative for chest pain.   Gastrointestinal:  Negative for nausea and vomiting.   Musculoskeletal:  Negative for back pain.   Skin:  Negative for rash.   Neurological:  Negative for dizziness.   Psychiatric/Behavioral:  Negative for agitation.        Objective:     BP (!) 182/83 (BP Location: Right arm, Patient Position: Sitting)   Pulse 64   Resp 16   Wt 85.3 kg (188 lb 0.8 oz)   BMI 24.81 kg/m²     Physical  Exam  Constitutional:       Appearance: Normal appearance.   Pulmonary:      Effort: Pulmonary effort is normal.   Abdominal:      Palpations: Abdomen is soft.   Genitourinary:     Comments: Urethral stacy catheter is in place draining yellow colored urine.  Neurological:      Mental Status: He is alert and oriented to person, place, and time.   Psychiatric:         Mood and Affect: Mood normal.         No visits with results within 1 Day(s) from this visit.   Latest known visit with results is:   Admission on 05/13/2025, Discharged on 05/13/2025   Component Date Value Ref Range Status    POCT Glucose 05/13/2025 178 (H)  70 - 110 mg/dL Final    POCT Glucose 05/13/2025 169 (H)  70 - 110 mg/dL Final        No results found for this or any previous visit (from the past 8760 hours).     Assessment:       1. Malignant neoplasm of overlapping sites of bladder    2. Nocturia    3. Overactive bladder    4. Pulmonary nodules        Plan:     Orders Placed This Encounter    CT Chest Without Contrast          10-14-24  CT urogram.  See report.  Kidneys/ Ureters: Punctate nonobstructing right renal calculus.  Too small to characterize hypodensities in both kidneys statistically representing cysts.  No hydronephrosis.  No hydroureter.  Both ureters are well opacified; no urothelial lesion in the bilateral renal pelves or ureters.  Bladder: Irregular bladder wall thickening posteriorly, more pronounced around the right ureteral vesicular junction.  There is also some bladder wall thickening along the dome of the bladder and anteriorly.  Reproductive organs: Unremarkable.  There are a few pulmonary nodules bilaterally such as the 6 x 4 mm nodule in the inferior right upper lobe. Additional index pulmonary nodule in the right upper lobe measures 8 mm x 6 mm.  Small gallstones.  Significant lumbar degenerative changes most pronounced at the L2-L3 level where there is at least moderate central canal stenosis.      11-22-23  Renal  ultrasound.  No hydronephrosis or renal stone is seen bilaterally.  9 mm, 7 mm, and 6 mm adjacent simple appearing left renal cysts.  No right renal lesion is seen.     I reviewed his above imaging result.            10-2-24  PSA 1.4     10-13-23  PSA 0.99    4-9-25  Cr 1.2.  GFR GFR 60.     10-2-24  Cr 1.6.  GFR 42.5.  BUN 20.     4-24-24  Cr 1.1.  GFR >60.    11-29-24  HgbA1c 6.7    4-9-25  Urine cytology.  Negative.     10-16-24  Urine cytology.  Atypical urothelial cells.     10-16-24  Urine urovysion.  Suspicious.     I reviewed his above lab result.            Assessment:  - Bladder cancer.  - S/p cystoscopy with bladder biopsies with fulguration of a red flat lesion at the right posterior bladder wall and another red flat lesion at the right posterior bladder wall closer to the dome with normal bilateral RGPs by me on 5-13-25.  Path:  Pending.  - Cystoscopy on 4-9-25 showed a flat red approximately 2 cm bladder lesion at the right posterior bladder wall just behind the trigone.  There is another similar appearing flat red approximately 2 cm bladder lesion at the right posterior bladder that is closer towards the dome of his bladder.  Moderate bladder trabeculations.  Mild BPH.  - History of a cystoscopy with bladder biopsies with fulguration of 3 small flat red bladder lesions (2 located at the posterior bladder wall and 1 located at the left dome of the bladder) with normal bilateral retrograde pyelograms by me on 12-3-24.  Path:  Chronic cystitis.  Negative for malignancy.  - Urine cytology on 10-16-24 showed atypical urothelial cells.  Urine urovysion on 10-16-24 was suspicious.  - CT urogram on 10-14-24 showed:  Kidneys/ Ureters: Punctate nonobstructing right renal calculus.  Too small to characterize hypodensities in both kidneys statistically representing cysts.  No hydronephrosis.  No hydroureter.  Both ureters are well opacified; no urothelial lesion in the bilateral renal pelves or ureters.  Bladder:  Irregular bladder wall thickening posteriorly, more pronounced around the right ureteral vesicular junction.  There is also some bladder wall thickening along the dome of the bladder and anteriorly.  Reproductive organs: Unremarkable.  There are a few pulmonary nodules bilaterally such as the 6 x 4 mm nodule in the inferior right upper lobe. Additional index pulmonary nodule in the right upper lobe measures 8 mm x 6 mm.    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  Path of his midline posterior wall, left posterior wall, two dome tumors, and trigone near the right ureteral orifice all showed high grade papillary urothelial carcinoma, no definitive evidence of invasion, and muscularis propria is present and uninvolved by tumor.  His right ureteral stent was removed cystoscopically on 5-24-24.    - He underwent 6 weeks of induction BCG treatments starting on 12-6-23.  - He underwent a second round of 6 weeks of BCG treatments from 6-6-24 to 8-19-24.  - History of a TURBT and bladder biopsies with fulguration of 5 bladder tumors, normal bilateral RGPs, and right ureteral stent placement on 11-8-23.  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  Other biopsy locations showed high grade papillary urothelial carcinoma non invasive.    - History of bladder biopsies on 8-29-23 by Dr Moe Henning, Urologist in [place].  Path showed high grade urothelial carcinoma with no definitive invasion.  - Persistent microscopic hematuria.    - Nocturia and OAB with some improvement with solifenacin 10 mg that was started on 4-9-25.  He was started on oxybutynin 5 mg ER 1 PO qhs on 10-16-24 without improvement in his symptoms.  Oxybutynin 5 mg ER 1 PO qhs was increased to 10 mg  ER 1 PO qhs on 11-13-24.  He said the oxybutynin 10 mg ER was not effective so he stopped taking it.  - Mild BPH without obstructive LUTS.  - He is a former smoker.  - CAD.  History of an MI.  He takes aspirin 81 mg and plavix.  Dr Ty Suh is his Cardiologist.     Plan:  - His urethral stacy catheter was removed today on 5-19-25.  - Pathology report from 5-13-25 is pending.  Will follow up on the result.  - CT chest without IV contrast in about 2 weeks.  Will plan to call him with the result.  - Continue solifenacin 10 mg ER 1 PO qhs that was started on 4-9-25.  - I discussed dietary modifications with him today and I recommended he drink mostly water during the day.  - I recommended he limit his fluid intake at night to small amounts of water and to void just prior to bedtime.   - RTC in 4 months for a surveillance cystoscopy or sooner as needed.                     [1]   Social History  Socioeconomic History    Marital status:    Tobacco Use    Smoking status: Former     Types: Cigarettes    Smokeless tobacco: Never    Tobacco comments:     Quit 30 yrs ago.    Substance and Sexual Activity    Alcohol use: Not Currently     Comment: Ocassionally drinks beer.    Drug use: Never     Social Drivers of Health     Financial Resource Strain: Low Risk  (10/31/2023)    Received from [facility] and Its Subsidiaries and Affiliates    Overall Financial Resource Strain (CARDIA)     Difficulty of Paying Living Expenses: Not very hard   Food Insecurity: No Food Insecurity (10/31/2023)    Received from [facility] and Its Subsidiaries and Affiliates    Hunger Vital Sign     Worried About Running Out of Food in the Last Year: Never true     Ran Out of Food in the Last Year: Never true   Transportation Needs: No Transportation Needs (10/31/2023)    Received from [employer] [email or web address] and Its Subsidiaries and  Affiliates    PRAPARE - Transportation     Lack of Transportation (Medical): No     Lack of Transportation (Non-Medical): No   Physical Activity: Insufficiently Active (10/31/2023)    Received from [facility] and Its Subsidiaries and Affiliates    Exercise Vital Sign     Days of Exercise per Week: 3 days     Minutes of Exercise per Session: 30 min   Housing Stability: Unknown (10/31/2023)    Received from [facility] and Its Subsidiaries and Affiliates    Housing Stability Vital Sign     Number of Places Lived in the Last Year: 1   [2]   Outpatient Encounter Medications as of 5/19/2025   Medication Sig Dispense Refill    aspirin (ECOTRIN) 81 MG EC tablet Take 1 tablet by mouth every morning.      clopidogreL (PLAVIX) 75 mg tablet Take 1 tablet by mouth every morning.      gabapentin (NEURONTIN) 300 MG capsule Take 300 mg by mouth Daily.      glipiZIDE (GLUCOTROL) 5 MG tablet Take 5 mg by mouth 2 (two) times daily.      HYDROcodone-acetaminophen (NORCO) 5-325 mg per tablet Take 1 tablet by mouth every 6 (six) hours as needed for Pain. 25 tablet 0    HYDROcodone-acetaminophen (NORCO) 5-325 mg per tablet Take 1 tablet by mouth every 6 (six) hours as needed for Pain. 20 tablet 0    hyoscyamine (LEVSIN) 0.125 mg Subl Place 1 tablet (0.125 mg total) under the tongue every 6 (six) hours as needed (Take 1 under the tongue every 6 hours as needed for bladder spasms). 30 tablet 1    hyoscyamine 0.125 mg Subl Place 1 tablet (0.125 mg total) under the tongue every 6 (six) hours as needed (Bladder spasms). 30 tablet 0    hyoscyamine 0.125 mg Subl Place 1 tablet (0.125 mg total) under the tongue every 6 (six) hours as needed (Bladder spasms). 30 tablet 0    isosorbide mononitrate (IMDUR) 120 MG 24 hr tablet Take 120 mg by mouth once daily.      losartan-hydrochlorothiazide 50-12.5 mg (HYZAAR) 50-12.5 mg per tablet Take 1 tablet by mouth once daily.       metFORMIN (GLUCOPHAGE) 1000 MG tablet Take 1,000 mg by mouth 2 (two) times daily.      oxybutynin (DITROPAN-XL) 10 MG 24 hr tablet Take 1 tablet (10 mg total) by mouth nightly. 90 tablet 3    oxybutynin (DITROPAN-XL) 5 MG TR24 Take 1 tablet (5 mg total) by mouth nightly. 90 tablet 1    simvastatin (ZOCOR) 20 MG tablet Take 20 mg by mouth.      solifenacin (VESICARE) 10 MG tablet Take 1 tablet (10 mg total) by mouth nightly. 90 tablet 1    sulfamethoxazole-trimethoprim 800-160mg (BACTRIM DS) 800-160 mg Tab Take 1 tablet by mouth 2 (two) times daily. 20 tablet 0     Facility-Administered Encounter Medications as of 5/19/2025   Medication Dose Route Frequency Provider Last Rate Last Admin    BCG live (PILO) 50 mg in sodium chloride 0.9% 50 mL bladder instillation  50 mg Intravesical See admin instructions         [DISCONTINUED] albuterol nebulizer solution 2.5 mg  2.5 mg Nebulization Q4H PRN Laura Munoz MD        [DISCONTINUED] diphenhydrAMINE injection 25 mg  25 mg Intravenous Q6H PRN Laura Munoz MD        [DISCONTINUED] fentaNYL 50 mcg/mL injection 25 mcg  25 mcg Intravenous Q5 Min PRN Laura Munoz MD        [DISCONTINUED] HYDROcodone-acetaminophen 5-325 mg per tablet 1 tablet  1 tablet Oral Q3H PRN Laura Munoz MD        [DISCONTINUED] LORazepam (ATIVAN) injection 0.25 mg  0.25 mg Intravenous Once PRN Laura Munoz MD        [DISCONTINUED] ondansetron injection 4 mg  4 mg Intravenous Once PRN Laura Munoz MD

## 2025-05-20 ENCOUNTER — RESULTS FOLLOW-UP (OUTPATIENT)
Dept: UROLOGY | Facility: HOSPITAL | Age: 85
End: 2025-05-20

## 2025-05-20 ENCOUNTER — TELEPHONE (OUTPATIENT)
Dept: UROLOGY | Facility: CLINIC | Age: 85
End: 2025-05-20
Payer: MEDICARE

## 2025-05-20 LAB
DHEA SERPL-MCNC: NORMAL
ESTROGEN SERPL-MCNC: NORMAL PG/ML
INSULIN SERPL-ACNC: NORMAL U[IU]/ML
LAB AP CLINICAL INFORMATION: NORMAL
LAB AP GROSS DESCRIPTION: NORMAL
LAB AP PERFORMING LOCATION(S): NORMAL
LAB AP REPORT FOOTNOTES: NORMAL

## 2025-05-20 NOTE — TELEPHONE ENCOUNTER
.Outgoing call attempted to notify patient regarding below but no answer, left voice message with contact information letting patient know he can contact us at his earliest convenience for more details.        ----- Message from Raoul Rachel MD sent at 5/20/2025 11:06 AM CDT -----  Please call Mr Crawford and let him know that I reviewed his pathology report and that one of the biopsy sites showed non invasive low grade bladder cancer and the other bladder biopsy site did not   show cancer.  He did not have any questions and he says that he is doing well.  Please call him and reschedule his surveillance cystoscopy to Wednesday 9- and make sure that he knows the   details of his upcoming CT chest.  Ask him if he has any questions.  ----- Message -----  From: Lab, Background User  Sent: 5/20/2025  10:43 AM CDT  To: Raoul Rachel MD

## 2025-05-21 ENCOUNTER — TELEPHONE (OUTPATIENT)
Dept: UROLOGY | Facility: CLINIC | Age: 85
End: 2025-05-21
Payer: MEDICARE

## 2025-05-21 NOTE — TELEPHONE ENCOUNTER
.Outgoing call attempted to notify patient regarding below but no answer, left voice message with contact information letting patient know he can contact us at his earliest convenience if he needs assistance.    ----- Message from Med Assistant Young sent at 5/21/2025 10:57 AM CDT -----  Contact: [phone]  Caller is Requesting a call backCaller:Alexander (pt)Reason For Call:Pt is requesting a call back to speak with staff regarding upcoming appt on 11/919/25.Best Call Back:[phone]   Yes

## 2025-05-29 ENCOUNTER — HOSPITAL ENCOUNTER (OUTPATIENT)
Dept: RADIOLOGY | Facility: HOSPITAL | Age: 85
Discharge: HOME | End: 2025-05-29
Attending: UROLOGY
Payer: MEDICARE

## 2025-05-29 DIAGNOSIS — R91.8 PULMONARY NODULES: ICD-10-CM

## 2025-05-29 DIAGNOSIS — C67.8 MALIGNANT NEOPLASM OF OVERLAPPING SITES OF BLADDER: ICD-10-CM

## 2025-05-29 PROCEDURE — 71250 CT THORAX DX C-: CPT | Mod: TC

## 2025-05-29 PROCEDURE — 71250 CT THORAX DX C-: CPT | Mod: 26,,, | Performed by: RADIOLOGY

## 2025-05-30 ENCOUNTER — RESULTS FOLLOW-UP (OUTPATIENT)
Dept: UROLOGY | Facility: HOSPITAL | Age: 85
End: 2025-05-30

## 2025-05-30 ENCOUNTER — TELEPHONE (OUTPATIENT)
Dept: UROLOGY | Facility: CLINIC | Age: 85
End: 2025-05-30
Payer: MEDICARE

## 2025-05-30 DIAGNOSIS — R93.89 ABNORMAL CT OF THE CHEST: Primary | ICD-10-CM

## 2025-05-30 DIAGNOSIS — R97.20 PSA ELEVATION: Primary | ICD-10-CM

## 2025-05-30 NOTE — TELEPHONE ENCOUNTER
Called and spoke to pt; informed him of the details of Dr. Rachel's message below; pt verbalized understanding.  Analia Rose LPN  ----- Message from Raoul Rachel MD sent at 5/30/2025 12:40 PM CDT -----  Please call Mr Crawford and let him know that I reviewed his CT chest result and it did not show any suspicious lung nodules.  It did show mild pulmonary interstitial fibrosis (scarring).  I recommend   that he see a Pulmonologist about this and I placed a referral to a Pulmonologist.  I would like for him to get his PSA checked at 1 pm on the day of his cystoscopy on 11-19-25 so please schedule   this lab visit to be done about 1 hour prior to his cystoscopy.  Ask him if he has any questions.  ----- Message -----  From: Tino, Rad Results In  Sent: 5/29/2025   5:21 PM CDT  To: Raoul Rachel MD

## 2025-06-17 ENCOUNTER — TELEPHONE (OUTPATIENT)
Dept: UROLOGY | Facility: CLINIC | Age: 85
End: 2025-06-17
Payer: MEDICARE

## 2025-06-17 NOTE — TELEPHONE ENCOUNTER
Received call from pt stating that he never heard from Dr. Sanford's office for a pulmonary appt; I told pt that I sent the referral on May 30, 2025 but I would call the office to find out the status. I called 's office at [phone] and was told that they had not been able to contact pt; I scheduled appt for July 21 2025 @ 11:30am;  contacted pt and informed him of appt.

## 2025-09-05 ENCOUNTER — TELEPHONE (OUTPATIENT)
Dept: UROLOGY | Facility: CLINIC | Age: 85
End: 2025-09-05
Payer: MEDICARE

## (undated) DEVICE — JELLY LUBRICANT STERILE 4 OZ

## (undated) DEVICE — JELLY SURGILUBE LUBE PKT 3GM

## (undated) DEVICE — LEGGING CLEAR POLY 2/PACK

## (undated) DEVICE — COVER LIGHT HANDLE 80/CA

## (undated) DEVICE — MARKER SKIN RULER STERILE

## (undated) DEVICE — SOL IRR NACL .9% 3000ML

## (undated) DEVICE — BAG URINARY LEG COMBO PACK STA

## (undated) DEVICE — BOWL STERILE LARGE 32OZ

## (undated) DEVICE — GOWN POLY REINF X-LONG XL

## (undated) DEVICE — COVER TABLE HVY DTY 60X90IN

## (undated) DEVICE — SUPPORT ULNA NERVE PROTECTOR

## (undated) DEVICE — TOWEL OR DISP STRL BLUE 4/PK

## (undated) DEVICE — SET IRR URLGY 2LINE UNIV SPIKE

## (undated) DEVICE — DRAPE T CYSTOSCOPY STERILE

## (undated) DEVICE — SPONGE COTTON TRAY 4X4IN

## (undated) DEVICE — Device

## (undated) DEVICE — DRESSING ADH FILM TELFA 2X3IN

## (undated) DEVICE — POSITIONER HEAD DONUT 9IN FOAM

## (undated) DEVICE — CONTAINER SPECIMEN OR STER 4OZ

## (undated) DEVICE — SYR ONLY LUER LOCK 20CC

## (undated) DEVICE — SYR 50ML CATH TIP

## (undated) DEVICE — IRRIGATION SET Y-TYPE TUR/BLAD

## (undated) DEVICE — TUBING SUC UNIV W/CONN 12FT

## (undated) DEVICE — SOL NACL IRR 3000ML

## (undated) DEVICE — GUIDE WIRE MOTION .035 X 150CM

## (undated) DEVICE — TRAY SKIN SCRUB WET PREMIUM

## (undated) DEVICE — UROVIEW 2600/2800

## (undated) DEVICE — GLOVE BIOGEL 7.0

## (undated) DEVICE — COVER CAMERA OPERATING ROOM

## (undated) DEVICE — CATH POLLACK OPEN-END FLEXI-TI

## (undated) DEVICE — GOWN POLY REINF BRTH SLV XL

## (undated) DEVICE — BAG DRAIN ANTI REFLUX 4000ML

## (undated) DEVICE — CANISTER SUCTION JUMBO 12L

## (undated) DEVICE — DEVICE SNAPSECURE FOL CATH

## (undated) DEVICE — ELECTRODE LOOP CUTTING BIPOLAR

## (undated) DEVICE — SOL IRRI STRL WATER 1000ML

## (undated) DEVICE — CATH URTRL OPEN END STR TIP 5F